# Patient Record
Sex: FEMALE | Race: WHITE | NOT HISPANIC OR LATINO
[De-identification: names, ages, dates, MRNs, and addresses within clinical notes are randomized per-mention and may not be internally consistent; named-entity substitution may affect disease eponyms.]

---

## 2017-01-13 ENCOUNTER — APPOINTMENT (OUTPATIENT)
Dept: PHYSICAL MEDICINE AND REHAB | Facility: CLINIC | Age: 82
End: 2017-01-13

## 2017-01-20 ENCOUNTER — APPOINTMENT (OUTPATIENT)
Dept: CARDIOLOGY | Facility: CLINIC | Age: 82
End: 2017-01-20

## 2017-01-20 ENCOUNTER — EMERGENCY (EMERGENCY)
Facility: HOSPITAL | Age: 82
LOS: 1 days | Discharge: HOME | End: 2017-01-20
Admitting: FAMILY MEDICINE
Payer: MEDICARE

## 2017-01-20 DIAGNOSIS — I95.9 HYPOTENSION, UNSPECIFIED: ICD-10-CM

## 2017-01-20 DIAGNOSIS — Z88.2 ALLERGY STATUS TO SULFONAMIDES: ICD-10-CM

## 2017-01-20 DIAGNOSIS — Z90.710 ACQUIRED ABSENCE OF BOTH CERVIX AND UTERUS: Chronic | ICD-10-CM

## 2017-01-20 DIAGNOSIS — Z79.899 OTHER LONG TERM (CURRENT) DRUG THERAPY: ICD-10-CM

## 2017-01-20 DIAGNOSIS — R07.89 OTHER CHEST PAIN: ICD-10-CM

## 2017-01-20 DIAGNOSIS — F41.9 ANXIETY DISORDER, UNSPECIFIED: ICD-10-CM

## 2017-01-20 DIAGNOSIS — I24.9 ACUTE ISCHEMIC HEART DISEASE, UNSPECIFIED: ICD-10-CM

## 2017-01-20 DIAGNOSIS — R55 SYNCOPE AND COLLAPSE: ICD-10-CM

## 2017-01-20 DIAGNOSIS — Z98.49 CATARACT EXTRACTION STATUS, UNSPECIFIED EYE: Chronic | ICD-10-CM

## 2017-01-20 PROCEDURE — 99220: CPT

## 2017-01-20 PROCEDURE — 99285 EMERGENCY DEPT VISIT HI MDM: CPT

## 2017-01-20 PROCEDURE — 74174 CTA ABD&PLVS W/CONTRAST: CPT | Mod: 26

## 2017-01-20 PROCEDURE — 71010: CPT | Mod: 26

## 2017-01-20 PROCEDURE — 71275 CT ANGIOGRAPHY CHEST: CPT | Mod: 26

## 2017-01-20 PROCEDURE — 70450 CT HEAD/BRAIN W/O DYE: CPT | Mod: 26

## 2017-01-20 PROCEDURE — 99214 OFFICE O/P EST MOD 30 MIN: CPT

## 2017-01-20 PROCEDURE — 99204 OFFICE O/P NEW MOD 45 MIN: CPT

## 2017-01-20 PROCEDURE — 93010 ELECTROCARDIOGRAM REPORT: CPT

## 2017-01-21 PROCEDURE — 85730 THROMBOPLASTIN TIME PARTIAL: CPT

## 2017-01-21 PROCEDURE — 74174 CTA ABD&PLVS W/CONTRAST: CPT

## 2017-01-21 PROCEDURE — 93306 TTE W/DOPPLER COMPLETE: CPT | Mod: 26

## 2017-01-21 PROCEDURE — 93010 ELECTROCARDIOGRAM REPORT: CPT

## 2017-01-21 PROCEDURE — 99217: CPT

## 2017-01-21 PROCEDURE — 71045 X-RAY EXAM CHEST 1 VIEW: CPT

## 2017-01-21 PROCEDURE — 93306 TTE W/DOPPLER COMPLETE: CPT

## 2017-01-21 PROCEDURE — 36415 COLL VENOUS BLD VENIPUNCTURE: CPT

## 2017-01-21 PROCEDURE — 84484 ASSAY OF TROPONIN QUANT: CPT

## 2017-01-21 PROCEDURE — 93005 ELECTROCARDIOGRAM TRACING: CPT

## 2017-01-21 PROCEDURE — 85027 COMPLETE CBC AUTOMATED: CPT

## 2017-01-21 PROCEDURE — 70450 CT HEAD/BRAIN W/O DYE: CPT

## 2017-01-21 PROCEDURE — 80048 BASIC METABOLIC PNL TOTAL CA: CPT

## 2017-01-21 PROCEDURE — 99214 OFFICE O/P EST MOD 30 MIN: CPT

## 2017-01-21 PROCEDURE — 96372 THER/PROPH/DIAG INJ SC/IM: CPT | Mod: XU

## 2017-01-21 PROCEDURE — 99284 EMERGENCY DEPT VISIT MOD MDM: CPT | Mod: 25

## 2017-01-21 PROCEDURE — G0378: CPT

## 2017-01-21 PROCEDURE — 85610 PROTHROMBIN TIME: CPT

## 2017-01-21 PROCEDURE — 71275 CT ANGIOGRAPHY CHEST: CPT

## 2017-03-27 ENCOUNTER — RX RENEWAL (OUTPATIENT)
Age: 82
End: 2017-03-27

## 2017-03-29 ENCOUNTER — APPOINTMENT (OUTPATIENT)
Dept: OBGYN | Facility: CLINIC | Age: 82
End: 2017-03-29

## 2017-03-29 VITALS
DIASTOLIC BLOOD PRESSURE: 90 MMHG | BODY MASS INDEX: 18.95 KG/M2 | RESPIRATION RATE: 18 BRPM | OXYGEN SATURATION: 98 % | HEART RATE: 86 BPM | HEIGHT: 64 IN | WEIGHT: 111 LBS | SYSTOLIC BLOOD PRESSURE: 150 MMHG

## 2017-03-29 DIAGNOSIS — N81.11 CYSTOCELE, MIDLINE: ICD-10-CM

## 2017-03-29 DIAGNOSIS — N39.0 URINARY TRACT INFECTION, SITE NOT SPECIFIED: ICD-10-CM

## 2017-03-29 LAB
APPEARANCE: CLEAR
BILIRUBIN URINE: NEGATIVE
BLOOD URINE: NEGATIVE
COLOR: YELLOW
GLUCOSE QUALITATIVE U: NEGATIVE
KETONES URINE: NEGATIVE
LEUKOCYTE ESTERASE URINE: NEGATIVE
NITRITE URINE: NEGATIVE
PH URINE: 7.5
PROTEIN URINE: NEGATIVE
SPECIFIC GRAVITY URINE: 1.01
UROBILINOGEN URINE: NORMAL

## 2017-03-29 RX ORDER — FLUTICASONE PROPIONATE 50 UG/1
50 SPRAY, METERED NASAL
Qty: 16 | Refills: 0 | Status: ACTIVE | COMMUNITY
Start: 2016-07-28

## 2017-03-29 RX ORDER — FLUOROURACIL 50 MG/G
5 CREAM TOPICAL
Qty: 40 | Refills: 0 | Status: ACTIVE | COMMUNITY
Start: 2016-10-31

## 2017-03-29 RX ORDER — TRIAMCINOLONE ACETONIDE 1 MG/G
0.1 CREAM TOPICAL
Qty: 80 | Refills: 0 | Status: ACTIVE | COMMUNITY
Start: 2016-10-31

## 2017-03-29 RX ORDER — CLOTRIMAZOLE 10 MG/1
10 LOZENGE ORAL
Qty: 70 | Refills: 0 | Status: ACTIVE | COMMUNITY
Start: 2016-12-27

## 2017-03-29 RX ORDER — PREDNISONE 10 MG/1
10 TABLET ORAL
Qty: 50 | Refills: 0 | Status: ACTIVE | COMMUNITY
Start: 2016-12-08

## 2017-03-31 ENCOUNTER — APPOINTMENT (OUTPATIENT)
Dept: OBGYN | Facility: CLINIC | Age: 82
End: 2017-03-31

## 2017-03-31 VITALS
WEIGHT: 111 LBS | HEIGHT: 64 IN | SYSTOLIC BLOOD PRESSURE: 138 MMHG | DIASTOLIC BLOOD PRESSURE: 82 MMHG | BODY MASS INDEX: 18.95 KG/M2 | OXYGEN SATURATION: 98 % | HEART RATE: 72 BPM

## 2017-03-31 DIAGNOSIS — R25.1 TREMOR, UNSPECIFIED: ICD-10-CM

## 2017-03-31 DIAGNOSIS — T83.9XXA UNSPECIFIED COMPLICATION OF GENITOURINARY PROSTHETIC DEVICE, IMPLANT AND GRAFT, INITIAL ENCOUNTER: ICD-10-CM

## 2017-03-31 LAB — BACTERIA UR CULT: NORMAL

## 2017-04-06 ENCOUNTER — APPOINTMENT (OUTPATIENT)
Dept: OBGYN | Facility: CLINIC | Age: 82
End: 2017-04-06

## 2017-04-11 ENCOUNTER — APPOINTMENT (OUTPATIENT)
Dept: CARDIOLOGY | Facility: CLINIC | Age: 82
End: 2017-04-11

## 2017-04-11 ENCOUNTER — NON-APPOINTMENT (OUTPATIENT)
Age: 82
End: 2017-04-11

## 2017-04-11 VITALS
SYSTOLIC BLOOD PRESSURE: 150 MMHG | RESPIRATION RATE: 17 BRPM | BODY MASS INDEX: 19.29 KG/M2 | DIASTOLIC BLOOD PRESSURE: 80 MMHG | HEART RATE: 94 BPM | HEIGHT: 64 IN | OXYGEN SATURATION: 97 % | WEIGHT: 113 LBS

## 2017-06-05 ENCOUNTER — OUTPATIENT (OUTPATIENT)
Dept: OUTPATIENT SERVICES | Facility: HOSPITAL | Age: 82
LOS: 1 days | End: 2017-06-05
Payer: MEDICARE

## 2017-06-05 ENCOUNTER — APPOINTMENT (OUTPATIENT)
Dept: RADIOLOGY | Facility: HOSPITAL | Age: 82
End: 2017-06-05

## 2017-06-05 DIAGNOSIS — Z90.710 ACQUIRED ABSENCE OF BOTH CERVIX AND UTERUS: Chronic | ICD-10-CM

## 2017-06-05 DIAGNOSIS — M54.6 PAIN IN THORACIC SPINE: ICD-10-CM

## 2017-06-05 DIAGNOSIS — Z98.49 CATARACT EXTRACTION STATUS, UNSPECIFIED EYE: Chronic | ICD-10-CM

## 2017-06-05 DIAGNOSIS — R06.02 SHORTNESS OF BREATH: ICD-10-CM

## 2017-06-05 DIAGNOSIS — R07.9 CHEST PAIN, UNSPECIFIED: ICD-10-CM

## 2017-06-05 PROCEDURE — 71046 X-RAY EXAM CHEST 2 VIEWS: CPT

## 2017-06-05 PROCEDURE — 72070 X-RAY EXAM THORAC SPINE 2VWS: CPT

## 2017-06-06 ENCOUNTER — OUTPATIENT (OUTPATIENT)
Dept: OUTPATIENT SERVICES | Facility: HOSPITAL | Age: 82
LOS: 1 days | End: 2017-06-06
Payer: MEDICARE

## 2017-06-06 ENCOUNTER — APPOINTMENT (OUTPATIENT)
Dept: MRI IMAGING | Facility: HOSPITAL | Age: 82
End: 2017-06-06

## 2017-06-06 DIAGNOSIS — Z98.49 CATARACT EXTRACTION STATUS, UNSPECIFIED EYE: Chronic | ICD-10-CM

## 2017-06-06 DIAGNOSIS — Z90.710 ACQUIRED ABSENCE OF BOTH CERVIX AND UTERUS: Chronic | ICD-10-CM

## 2017-06-06 DIAGNOSIS — M51.34 OTHER INTERVERTEBRAL DISC DEGENERATION, THORACIC REGION: ICD-10-CM

## 2017-06-06 PROCEDURE — 72146 MRI CHEST SPINE W/O DYE: CPT

## 2017-09-06 ENCOUNTER — RX RENEWAL (OUTPATIENT)
Age: 82
End: 2017-09-06

## 2017-09-27 ENCOUNTER — APPOINTMENT (OUTPATIENT)
Dept: PULMONOLOGY | Facility: CLINIC | Age: 82
End: 2017-09-27
Payer: MEDICARE

## 2017-09-27 VITALS
OXYGEN SATURATION: 97 % | HEIGHT: 64 IN | SYSTOLIC BLOOD PRESSURE: 132 MMHG | DIASTOLIC BLOOD PRESSURE: 80 MMHG | WEIGHT: 109 LBS | BODY MASS INDEX: 18.61 KG/M2 | HEART RATE: 99 BPM

## 2017-09-27 DIAGNOSIS — R05 COUGH: ICD-10-CM

## 2017-09-27 PROCEDURE — 99214 OFFICE O/P EST MOD 30 MIN: CPT

## 2017-10-17 ENCOUNTER — APPOINTMENT (OUTPATIENT)
Dept: CARDIOLOGY | Facility: CLINIC | Age: 82
End: 2017-10-17
Payer: MEDICARE

## 2017-10-17 ENCOUNTER — NON-APPOINTMENT (OUTPATIENT)
Age: 82
End: 2017-10-17

## 2017-10-17 VITALS
DIASTOLIC BLOOD PRESSURE: 90 MMHG | RESPIRATION RATE: 17 BRPM | WEIGHT: 109 LBS | OXYGEN SATURATION: 98 % | HEIGHT: 64 IN | SYSTOLIC BLOOD PRESSURE: 153 MMHG | BODY MASS INDEX: 18.61 KG/M2 | HEART RATE: 75 BPM

## 2017-10-17 PROCEDURE — 99215 OFFICE O/P EST HI 40 MIN: CPT

## 2017-10-17 PROCEDURE — 93000 ELECTROCARDIOGRAM COMPLETE: CPT

## 2017-10-23 ENCOUNTER — APPOINTMENT (OUTPATIENT)
Dept: ORTHOPEDIC SURGERY | Facility: CLINIC | Age: 82
End: 2017-10-23
Payer: MEDICARE

## 2017-10-23 VITALS
BODY MASS INDEX: 18.44 KG/M2 | HEIGHT: 64 IN | DIASTOLIC BLOOD PRESSURE: 95 MMHG | WEIGHT: 108 LBS | SYSTOLIC BLOOD PRESSURE: 154 MMHG | HEART RATE: 77 BPM

## 2017-10-23 DIAGNOSIS — M79.604 PAIN IN RIGHT LEG: ICD-10-CM

## 2017-10-23 DIAGNOSIS — M79.605 PAIN IN RIGHT LEG: ICD-10-CM

## 2017-10-23 PROCEDURE — 73565 X-RAY EXAM OF KNEES: CPT | Mod: RT

## 2017-10-23 PROCEDURE — 99203 OFFICE O/P NEW LOW 30 MIN: CPT

## 2017-11-13 ENCOUNTER — RX RENEWAL (OUTPATIENT)
Age: 82
End: 2017-11-13

## 2017-12-08 ENCOUNTER — RX RENEWAL (OUTPATIENT)
Age: 82
End: 2017-12-08

## 2017-12-14 ENCOUNTER — FORM ENCOUNTER (OUTPATIENT)
Age: 82
End: 2017-12-14

## 2017-12-15 ENCOUNTER — APPOINTMENT (OUTPATIENT)
Dept: MAMMOGRAPHY | Facility: HOSPITAL | Age: 82
End: 2017-12-15
Payer: MEDICARE

## 2017-12-15 ENCOUNTER — OUTPATIENT (OUTPATIENT)
Dept: OUTPATIENT SERVICES | Facility: HOSPITAL | Age: 82
LOS: 1 days | End: 2017-12-15
Payer: MEDICARE

## 2017-12-15 DIAGNOSIS — Z12.31 ENCOUNTER FOR SCREENING MAMMOGRAM FOR MALIGNANT NEOPLASM OF BREAST: ICD-10-CM

## 2017-12-15 DIAGNOSIS — Z90.710 ACQUIRED ABSENCE OF BOTH CERVIX AND UTERUS: Chronic | ICD-10-CM

## 2017-12-15 DIAGNOSIS — Z98.49 CATARACT EXTRACTION STATUS, UNSPECIFIED EYE: Chronic | ICD-10-CM

## 2017-12-15 DIAGNOSIS — Z00.8 ENCOUNTER FOR OTHER GENERAL EXAMINATION: ICD-10-CM

## 2017-12-15 PROCEDURE — 77067 SCR MAMMO BI INCL CAD: CPT

## 2017-12-15 PROCEDURE — 77063 BREAST TOMOSYNTHESIS BI: CPT | Mod: 26

## 2017-12-15 PROCEDURE — 77063 BREAST TOMOSYNTHESIS BI: CPT

## 2017-12-15 PROCEDURE — G0202: CPT | Mod: 26

## 2017-12-27 ENCOUNTER — FORM ENCOUNTER (OUTPATIENT)
Age: 82
End: 2017-12-27

## 2017-12-27 DIAGNOSIS — R92.8 OTHER ABNORMAL AND INCONCLUSIVE FINDINGS ON DIAGNOSTIC IMAGING OF BREAST: ICD-10-CM

## 2017-12-27 DIAGNOSIS — R93.8 ABNORMAL FINDINGS ON DIAGNOSTIC IMAGING OF OTHER SPECIFIED BODY STRUCTURES: ICD-10-CM

## 2017-12-28 ENCOUNTER — APPOINTMENT (OUTPATIENT)
Dept: ULTRASOUND IMAGING | Facility: HOSPITAL | Age: 82
End: 2017-12-28
Payer: MEDICARE

## 2017-12-28 ENCOUNTER — APPOINTMENT (OUTPATIENT)
Dept: MAMMOGRAPHY | Facility: HOSPITAL | Age: 82
End: 2017-12-28
Payer: MEDICARE

## 2017-12-28 ENCOUNTER — OUTPATIENT (OUTPATIENT)
Dept: OUTPATIENT SERVICES | Facility: HOSPITAL | Age: 82
LOS: 1 days | End: 2017-12-28
Payer: MEDICARE

## 2017-12-28 DIAGNOSIS — R92.0 MAMMOGRAPHIC MICROCALCIFICATION FOUND ON DIAGNOSTIC IMAGING OF BREAST: ICD-10-CM

## 2017-12-28 DIAGNOSIS — Z90.710 ACQUIRED ABSENCE OF BOTH CERVIX AND UTERUS: Chronic | ICD-10-CM

## 2017-12-28 DIAGNOSIS — Z00.8 ENCOUNTER FOR OTHER GENERAL EXAMINATION: ICD-10-CM

## 2017-12-28 DIAGNOSIS — Z98.49 CATARACT EXTRACTION STATUS, UNSPECIFIED EYE: Chronic | ICD-10-CM

## 2017-12-28 PROCEDURE — G0206: CPT | Mod: 26

## 2017-12-28 PROCEDURE — G0279: CPT | Mod: 26

## 2017-12-28 PROCEDURE — 77065 DX MAMMO INCL CAD UNI: CPT

## 2017-12-28 PROCEDURE — G0279: CPT

## 2017-12-29 PROBLEM — R93.8 ABNORMAL CHEST XRAY: Status: ACTIVE | Noted: 2017-12-29

## 2017-12-29 PROBLEM — R92.8 ABNORMAL MAMMOGRAM: Status: ACTIVE | Noted: 2017-12-29

## 2018-01-19 ENCOUNTER — APPOINTMENT (OUTPATIENT)
Dept: CARDIOLOGY | Facility: CLINIC | Age: 83
End: 2018-01-19
Payer: MEDICARE

## 2018-01-19 ENCOUNTER — NON-APPOINTMENT (OUTPATIENT)
Age: 83
End: 2018-01-19

## 2018-01-19 VITALS
RESPIRATION RATE: 17 BRPM | SYSTOLIC BLOOD PRESSURE: 180 MMHG | BODY MASS INDEX: 17.75 KG/M2 | HEART RATE: 68 BPM | OXYGEN SATURATION: 98 % | HEIGHT: 64 IN | DIASTOLIC BLOOD PRESSURE: 96 MMHG | WEIGHT: 104 LBS

## 2018-01-19 DIAGNOSIS — R53.81 OTHER MALAISE: ICD-10-CM

## 2018-01-19 DIAGNOSIS — R53.83 OTHER MALAISE: ICD-10-CM

## 2018-01-19 PROCEDURE — 93306 TTE W/DOPPLER COMPLETE: CPT

## 2018-01-19 PROCEDURE — 99215 OFFICE O/P EST HI 40 MIN: CPT

## 2018-01-19 PROCEDURE — 93000 ELECTROCARDIOGRAM COMPLETE: CPT

## 2018-01-29 RX ORDER — DILTIAZEM HYDROCHLORIDE 120 MG/1
120 CAPSULE, EXTENDED RELEASE ORAL
Qty: 30 | Refills: 1 | Status: DISCONTINUED | COMMUNITY
Start: 2018-01-19 | End: 2018-01-29

## 2018-02-08 ENCOUNTER — FORM ENCOUNTER (OUTPATIENT)
Age: 83
End: 2018-02-08

## 2018-02-09 ENCOUNTER — OUTPATIENT (OUTPATIENT)
Dept: OUTPATIENT SERVICES | Facility: HOSPITAL | Age: 83
LOS: 1 days | End: 2018-02-09
Payer: MEDICARE

## 2018-02-09 ENCOUNTER — APPOINTMENT (OUTPATIENT)
Dept: MAMMOGRAPHY | Facility: CLINIC | Age: 83
End: 2018-02-09

## 2018-02-09 ENCOUNTER — RESULT REVIEW (OUTPATIENT)
Age: 83
End: 2018-02-09

## 2018-02-09 DIAGNOSIS — Z00.8 ENCOUNTER FOR OTHER GENERAL EXAMINATION: ICD-10-CM

## 2018-02-09 DIAGNOSIS — Z98.49 CATARACT EXTRACTION STATUS, UNSPECIFIED EYE: Chronic | ICD-10-CM

## 2018-02-09 DIAGNOSIS — Z90.710 ACQUIRED ABSENCE OF BOTH CERVIX AND UTERUS: Chronic | ICD-10-CM

## 2018-02-09 PROCEDURE — 77065 DX MAMMO INCL CAD UNI: CPT | Mod: 26,LT

## 2018-02-09 PROCEDURE — 19081 BX BREAST 1ST LESION STRTCTC: CPT | Mod: LT

## 2018-02-09 PROCEDURE — 19081 BX BREAST 1ST LESION STRTCTC: CPT

## 2018-02-09 PROCEDURE — 77065 DX MAMMO INCL CAD UNI: CPT

## 2018-02-09 PROCEDURE — A4648: CPT

## 2018-02-23 ENCOUNTER — APPOINTMENT (OUTPATIENT)
Dept: MRI IMAGING | Facility: CLINIC | Age: 83
End: 2018-02-23

## 2018-02-23 ENCOUNTER — OUTPATIENT (OUTPATIENT)
Dept: OUTPATIENT SERVICES | Facility: HOSPITAL | Age: 83
LOS: 1 days | End: 2018-02-23
Payer: MEDICARE

## 2018-02-23 DIAGNOSIS — Z98.49 CATARACT EXTRACTION STATUS, UNSPECIFIED EYE: Chronic | ICD-10-CM

## 2018-02-23 DIAGNOSIS — Z00.8 ENCOUNTER FOR OTHER GENERAL EXAMINATION: ICD-10-CM

## 2018-02-23 DIAGNOSIS — Z90.710 ACQUIRED ABSENCE OF BOTH CERVIX AND UTERUS: Chronic | ICD-10-CM

## 2018-02-23 PROCEDURE — 72195 MRI PELVIS W/O DYE: CPT

## 2018-02-23 PROCEDURE — 72195 MRI PELVIS W/O DYE: CPT | Mod: 26

## 2018-03-23 ENCOUNTER — RX RENEWAL (OUTPATIENT)
Age: 83
End: 2018-03-23

## 2018-04-20 ENCOUNTER — APPOINTMENT (OUTPATIENT)
Dept: CARDIOLOGY | Facility: CLINIC | Age: 83
End: 2018-04-20
Payer: MEDICARE

## 2018-04-20 ENCOUNTER — NON-APPOINTMENT (OUTPATIENT)
Age: 83
End: 2018-04-20

## 2018-04-20 VITALS
BODY MASS INDEX: 18.1 KG/M2 | OXYGEN SATURATION: 98 % | SYSTOLIC BLOOD PRESSURE: 158 MMHG | WEIGHT: 106 LBS | RESPIRATION RATE: 17 BRPM | HEIGHT: 64 IN | DIASTOLIC BLOOD PRESSURE: 82 MMHG | HEART RATE: 107 BPM

## 2018-04-20 PROCEDURE — 93000 ELECTROCARDIOGRAM COMPLETE: CPT

## 2018-04-20 PROCEDURE — 99215 OFFICE O/P EST HI 40 MIN: CPT

## 2018-04-23 ENCOUNTER — OUTPATIENT (OUTPATIENT)
Dept: OUTPATIENT SERVICES | Facility: HOSPITAL | Age: 83
LOS: 1 days | End: 2018-04-23
Payer: MEDICARE

## 2018-04-23 DIAGNOSIS — Z90.710 ACQUIRED ABSENCE OF BOTH CERVIX AND UTERUS: Chronic | ICD-10-CM

## 2018-04-23 DIAGNOSIS — Z98.49 CATARACT EXTRACTION STATUS, UNSPECIFIED EYE: Chronic | ICD-10-CM

## 2018-04-23 DIAGNOSIS — Z01.818 ENCOUNTER FOR OTHER PREPROCEDURAL EXAMINATION: ICD-10-CM

## 2018-04-23 DIAGNOSIS — N81.6 RECTOCELE: ICD-10-CM

## 2018-04-23 DIAGNOSIS — I10 ESSENTIAL (PRIMARY) HYPERTENSION: ICD-10-CM

## 2018-04-23 PROCEDURE — 85027 COMPLETE CBC AUTOMATED: CPT

## 2018-04-23 PROCEDURE — 87086 URINE CULTURE/COLONY COUNT: CPT

## 2018-04-23 PROCEDURE — 36415 COLL VENOUS BLD VENIPUNCTURE: CPT

## 2018-04-23 PROCEDURE — G0463: CPT

## 2018-04-23 PROCEDURE — 81003 URINALYSIS AUTO W/O SCOPE: CPT

## 2018-04-23 PROCEDURE — 80048 BASIC METABOLIC PNL TOTAL CA: CPT

## 2018-04-30 ENCOUNTER — APPOINTMENT (OUTPATIENT)
Dept: PULMONOLOGY | Facility: CLINIC | Age: 83
End: 2018-04-30
Payer: MEDICARE

## 2018-04-30 VITALS
TEMPERATURE: 97.8 F | DIASTOLIC BLOOD PRESSURE: 70 MMHG | SYSTOLIC BLOOD PRESSURE: 140 MMHG | RESPIRATION RATE: 18 BRPM | HEIGHT: 64 IN | BODY MASS INDEX: 17.93 KG/M2 | OXYGEN SATURATION: 98 % | WEIGHT: 105 LBS | HEART RATE: 72 BPM

## 2018-04-30 DIAGNOSIS — N81.9 FEMALE GENITAL PROLAPSE, UNSPECIFIED: ICD-10-CM

## 2018-04-30 PROCEDURE — 99213 OFFICE O/P EST LOW 20 MIN: CPT

## 2018-05-06 ENCOUNTER — TRANSCRIPTION ENCOUNTER (OUTPATIENT)
Age: 83
End: 2018-05-06

## 2018-05-07 ENCOUNTER — OUTPATIENT (OUTPATIENT)
Dept: OUTPATIENT SERVICES | Facility: HOSPITAL | Age: 83
LOS: 1 days | End: 2018-05-07
Payer: MEDICARE

## 2018-05-07 DIAGNOSIS — I10 ESSENTIAL (PRIMARY) HYPERTENSION: ICD-10-CM

## 2018-05-07 DIAGNOSIS — I49.1 ATRIAL PREMATURE DEPOLARIZATION: ICD-10-CM

## 2018-05-07 DIAGNOSIS — Z88.2 ALLERGY STATUS TO SULFONAMIDES: ICD-10-CM

## 2018-05-07 DIAGNOSIS — I71.9 AORTIC ANEURYSM OF UNSPECIFIED SITE, WITHOUT RUPTURE: ICD-10-CM

## 2018-05-07 DIAGNOSIS — N99.3 PROLAPSE OF VAGINAL VAULT AFTER HYSTERECTOMY: ICD-10-CM

## 2018-05-07 DIAGNOSIS — M81.6 LOCALIZED OSTEOPOROSIS [LEQUESNE]: ICD-10-CM

## 2018-05-07 DIAGNOSIS — Z90.710 ACQUIRED ABSENCE OF BOTH CERVIX AND UTERUS: Chronic | ICD-10-CM

## 2018-05-07 DIAGNOSIS — I25.10 ATHEROSCLEROTIC HEART DISEASE OF NATIVE CORONARY ARTERY WITHOUT ANGINA PECTORIS: ICD-10-CM

## 2018-05-07 DIAGNOSIS — E78.5 HYPERLIPIDEMIA, UNSPECIFIED: ICD-10-CM

## 2018-05-07 DIAGNOSIS — G47.00 INSOMNIA, UNSPECIFIED: ICD-10-CM

## 2018-05-07 DIAGNOSIS — M81.0 AGE-RELATED OSTEOPOROSIS WITHOUT CURRENT PATHOLOGICAL FRACTURE: ICD-10-CM

## 2018-05-07 DIAGNOSIS — Z79.52 LONG TERM (CURRENT) USE OF SYSTEMIC STEROIDS: ICD-10-CM

## 2018-05-07 DIAGNOSIS — Z88.1 ALLERGY STATUS TO OTHER ANTIBIOTIC AGENTS STATUS: ICD-10-CM

## 2018-05-07 DIAGNOSIS — Z98.49 CATARACT EXTRACTION STATUS, UNSPECIFIED EYE: Chronic | ICD-10-CM

## 2018-05-08 PROCEDURE — 57250 REPAIR RECTUM & VAGINA: CPT

## 2018-05-08 PROCEDURE — 57267 INSERT MESH/PELVIC FLR ADDON: CPT

## 2018-05-08 PROCEDURE — C1763: CPT

## 2018-05-11 ENCOUNTER — APPOINTMENT (OUTPATIENT)
Dept: CARDIOLOGY | Facility: CLINIC | Age: 83
End: 2018-05-11

## 2018-05-22 ENCOUNTER — RX RENEWAL (OUTPATIENT)
Age: 83
End: 2018-05-22

## 2018-06-15 ENCOUNTER — MEDICATION RENEWAL (OUTPATIENT)
Age: 83
End: 2018-06-15

## 2018-07-20 ENCOUNTER — NON-APPOINTMENT (OUTPATIENT)
Age: 83
End: 2018-07-20

## 2018-07-20 ENCOUNTER — APPOINTMENT (OUTPATIENT)
Dept: CARDIOLOGY | Facility: CLINIC | Age: 83
End: 2018-07-20
Payer: MEDICARE

## 2018-07-20 VITALS
HEIGHT: 64 IN | BODY MASS INDEX: 19.46 KG/M2 | SYSTOLIC BLOOD PRESSURE: 148 MMHG | OXYGEN SATURATION: 96 % | RESPIRATION RATE: 17 BRPM | DIASTOLIC BLOOD PRESSURE: 84 MMHG | WEIGHT: 114 LBS | HEART RATE: 69 BPM

## 2018-07-20 DIAGNOSIS — R60.9 EDEMA, UNSPECIFIED: ICD-10-CM

## 2018-07-20 PROCEDURE — 93000 ELECTROCARDIOGRAM COMPLETE: CPT

## 2018-07-20 PROCEDURE — 99215 OFFICE O/P EST HI 40 MIN: CPT

## 2018-07-20 RX ORDER — AMLODIPINE BESYLATE 5 MG/1
5 TABLET ORAL DAILY
Qty: 90 | Refills: 1 | Status: DISCONTINUED | COMMUNITY
Start: 2018-04-20 | End: 2018-07-20

## 2018-07-27 ENCOUNTER — APPOINTMENT (OUTPATIENT)
Dept: CARDIOLOGY | Facility: CLINIC | Age: 83
End: 2018-07-27

## 2018-08-10 ENCOUNTER — OUTPATIENT (OUTPATIENT)
Dept: OUTPATIENT SERVICES | Facility: HOSPITAL | Age: 83
LOS: 1 days | End: 2018-08-10
Payer: MEDICARE

## 2018-08-10 ENCOUNTER — MEDICATION RENEWAL (OUTPATIENT)
Age: 83
End: 2018-08-10

## 2018-08-10 ENCOUNTER — APPOINTMENT (OUTPATIENT)
Dept: MAMMOGRAPHY | Facility: HOSPITAL | Age: 83
End: 2018-08-10
Payer: MEDICARE

## 2018-08-10 DIAGNOSIS — Z90.710 ACQUIRED ABSENCE OF BOTH CERVIX AND UTERUS: Chronic | ICD-10-CM

## 2018-08-10 DIAGNOSIS — Z00.8 ENCOUNTER FOR OTHER GENERAL EXAMINATION: ICD-10-CM

## 2018-08-10 DIAGNOSIS — Z98.49 CATARACT EXTRACTION STATUS, UNSPECIFIED EYE: Chronic | ICD-10-CM

## 2018-08-10 PROCEDURE — G0279: CPT

## 2018-08-10 PROCEDURE — G0279: CPT | Mod: 26

## 2018-08-10 PROCEDURE — 77065 DX MAMMO INCL CAD UNI: CPT

## 2018-08-10 PROCEDURE — 77065 DX MAMMO INCL CAD UNI: CPT | Mod: 26,LT

## 2018-08-16 ENCOUNTER — APPOINTMENT (OUTPATIENT)
Dept: OBGYN | Facility: CLINIC | Age: 83
End: 2018-08-16
Payer: MEDICARE

## 2018-08-16 VITALS
RESPIRATION RATE: 16 BRPM | OXYGEN SATURATION: 98 % | WEIGHT: 114 LBS | BODY MASS INDEX: 19.46 KG/M2 | HEART RATE: 66 BPM | DIASTOLIC BLOOD PRESSURE: 86 MMHG | HEIGHT: 64 IN | SYSTOLIC BLOOD PRESSURE: 166 MMHG

## 2018-08-16 DIAGNOSIS — L29.9 PRURITUS, UNSPECIFIED: ICD-10-CM

## 2018-08-16 PROCEDURE — 99214 OFFICE O/P EST MOD 30 MIN: CPT

## 2018-08-16 RX ORDER — LEVOFLOXACIN 500 MG/1
500 TABLET, FILM COATED ORAL DAILY
Qty: 7 | Refills: 5 | Status: COMPLETED | COMMUNITY
Start: 2017-09-27 | End: 2018-08-16

## 2018-08-16 RX ORDER — FLUOCINONIDE 0.05 MG/G
0.05 OINTMENT TOPICAL
Qty: 1 | Refills: 3 | Status: ACTIVE | COMMUNITY
Start: 2018-08-16 | End: 1900-01-01

## 2018-08-16 RX ORDER — LEVOFLOXACIN 500 MG/1
500 TABLET, FILM COATED ORAL DAILY
Qty: 7 | Refills: 5 | Status: COMPLETED | COMMUNITY
Start: 2018-04-09 | End: 2018-08-16

## 2018-08-17 ENCOUNTER — OTHER (OUTPATIENT)
Age: 83
End: 2018-08-17

## 2018-08-17 LAB
CANDIDA VAG CYTO: NOT DETECTED
G VAGINALIS+PREV SP MTYP VAG QL MICRO: DETECTED
T VAGINALIS VAG QL WET PREP: NOT DETECTED

## 2018-10-19 ENCOUNTER — APPOINTMENT (OUTPATIENT)
Dept: CARDIOLOGY | Facility: CLINIC | Age: 83
End: 2018-10-19
Payer: MEDICARE

## 2018-10-19 ENCOUNTER — NON-APPOINTMENT (OUTPATIENT)
Age: 83
End: 2018-10-19

## 2018-10-19 VITALS
OXYGEN SATURATION: 98 % | RESPIRATION RATE: 17 BRPM | HEIGHT: 64 IN | WEIGHT: 114 LBS | BODY MASS INDEX: 19.46 KG/M2 | SYSTOLIC BLOOD PRESSURE: 160 MMHG | HEART RATE: 63 BPM | DIASTOLIC BLOOD PRESSURE: 90 MMHG

## 2018-10-19 PROCEDURE — 99214 OFFICE O/P EST MOD 30 MIN: CPT

## 2018-10-19 PROCEDURE — 93000 ELECTROCARDIOGRAM COMPLETE: CPT

## 2018-12-24 ENCOUNTER — RX RENEWAL (OUTPATIENT)
Age: 83
End: 2018-12-24

## 2019-01-16 ENCOUNTER — OUTPATIENT (OUTPATIENT)
Dept: OUTPATIENT SERVICES | Facility: HOSPITAL | Age: 84
LOS: 1 days | End: 2019-01-16
Payer: MEDICARE

## 2019-01-16 ENCOUNTER — APPOINTMENT (OUTPATIENT)
Dept: CT IMAGING | Facility: HOSPITAL | Age: 84
End: 2019-01-16
Payer: MEDICARE

## 2019-01-16 DIAGNOSIS — Z98.49 CATARACT EXTRACTION STATUS, UNSPECIFIED EYE: Chronic | ICD-10-CM

## 2019-01-16 DIAGNOSIS — Z00.8 ENCOUNTER FOR OTHER GENERAL EXAMINATION: ICD-10-CM

## 2019-01-16 DIAGNOSIS — Z90.710 ACQUIRED ABSENCE OF BOTH CERVIX AND UTERUS: Chronic | ICD-10-CM

## 2019-01-16 PROCEDURE — 74176 CT ABD & PELVIS W/O CONTRAST: CPT | Mod: 26

## 2019-01-16 PROCEDURE — 74176 CT ABD & PELVIS W/O CONTRAST: CPT

## 2019-01-23 ENCOUNTER — RX RENEWAL (OUTPATIENT)
Age: 84
End: 2019-01-23

## 2019-02-25 ENCOUNTER — APPOINTMENT (OUTPATIENT)
Dept: ULTRASOUND IMAGING | Facility: HOSPITAL | Age: 84
End: 2019-02-25
Payer: MEDICARE

## 2019-02-25 ENCOUNTER — OUTPATIENT (OUTPATIENT)
Dept: OUTPATIENT SERVICES | Facility: HOSPITAL | Age: 84
LOS: 1 days | End: 2019-02-25
Payer: MEDICARE

## 2019-02-25 ENCOUNTER — APPOINTMENT (OUTPATIENT)
Dept: MAMMOGRAPHY | Facility: HOSPITAL | Age: 84
End: 2019-02-25
Payer: MEDICARE

## 2019-02-25 DIAGNOSIS — Z00.8 ENCOUNTER FOR OTHER GENERAL EXAMINATION: ICD-10-CM

## 2019-02-25 DIAGNOSIS — Z98.49 CATARACT EXTRACTION STATUS, UNSPECIFIED EYE: Chronic | ICD-10-CM

## 2019-02-25 DIAGNOSIS — Z90.710 ACQUIRED ABSENCE OF BOTH CERVIX AND UTERUS: Chronic | ICD-10-CM

## 2019-02-25 PROCEDURE — 76641 ULTRASOUND BREAST COMPLETE: CPT | Mod: 26

## 2019-02-25 PROCEDURE — 77065 DX MAMMO INCL CAD UNI: CPT | Mod: 26,GG,LT

## 2019-02-25 PROCEDURE — 77067 SCR MAMMO BI INCL CAD: CPT

## 2019-02-25 PROCEDURE — 77063 BREAST TOMOSYNTHESIS BI: CPT

## 2019-02-25 PROCEDURE — 77063 BREAST TOMOSYNTHESIS BI: CPT | Mod: 26,59

## 2019-02-25 PROCEDURE — 76641 ULTRASOUND BREAST COMPLETE: CPT

## 2019-02-25 PROCEDURE — 77067 SCR MAMMO BI INCL CAD: CPT | Mod: 26,59

## 2019-02-25 PROCEDURE — 77065 DX MAMMO INCL CAD UNI: CPT

## 2019-03-01 ENCOUNTER — APPOINTMENT (OUTPATIENT)
Dept: CARDIOLOGY | Facility: CLINIC | Age: 84
End: 2019-03-01
Payer: MEDICARE

## 2019-03-01 ENCOUNTER — NON-APPOINTMENT (OUTPATIENT)
Age: 84
End: 2019-03-01

## 2019-03-01 VITALS
WEIGHT: 106 LBS | OXYGEN SATURATION: 96 % | HEART RATE: 63 BPM | BODY MASS INDEX: 18.1 KG/M2 | SYSTOLIC BLOOD PRESSURE: 180 MMHG | HEIGHT: 64 IN | RESPIRATION RATE: 17 BRPM | DIASTOLIC BLOOD PRESSURE: 96 MMHG

## 2019-03-01 PROCEDURE — 93306 TTE W/DOPPLER COMPLETE: CPT

## 2019-03-01 PROCEDURE — 99215 OFFICE O/P EST HI 40 MIN: CPT

## 2019-03-01 PROCEDURE — 93000 ELECTROCARDIOGRAM COMPLETE: CPT

## 2019-03-05 ENCOUNTER — OUTPATIENT (OUTPATIENT)
Dept: OUTPATIENT SERVICES | Facility: HOSPITAL | Age: 84
LOS: 1 days | End: 2019-03-05
Payer: MEDICARE

## 2019-03-05 ENCOUNTER — RESULT REVIEW (OUTPATIENT)
Age: 84
End: 2019-03-05

## 2019-03-05 DIAGNOSIS — Z98.49 CATARACT EXTRACTION STATUS, UNSPECIFIED EYE: Chronic | ICD-10-CM

## 2019-03-05 DIAGNOSIS — N63.0 UNSPECIFIED LUMP IN UNSPECIFIED BREAST: ICD-10-CM

## 2019-03-05 DIAGNOSIS — R92.8 OTHER ABNORMAL AND INCONCLUSIVE FINDINGS ON DIAGNOSTIC IMAGING OF BREAST: ICD-10-CM

## 2019-03-05 DIAGNOSIS — Z90.710 ACQUIRED ABSENCE OF BOTH CERVIX AND UTERUS: Chronic | ICD-10-CM

## 2019-03-05 PROCEDURE — A4648: CPT

## 2019-03-05 PROCEDURE — 88305 TISSUE EXAM BY PATHOLOGIST: CPT | Mod: 26

## 2019-03-05 PROCEDURE — 88305 TISSUE EXAM BY PATHOLOGIST: CPT

## 2019-03-05 PROCEDURE — 88360 TUMOR IMMUNOHISTOCHEM/MANUAL: CPT | Mod: 26

## 2019-03-05 PROCEDURE — 88360 TUMOR IMMUNOHISTOCHEM/MANUAL: CPT

## 2019-03-05 PROCEDURE — 19083 BX BREAST 1ST LESION US IMAG: CPT

## 2019-03-05 PROCEDURE — 19083 BX BREAST 1ST LESION US IMAG: CPT | Mod: LT

## 2019-03-29 ENCOUNTER — RX RENEWAL (OUTPATIENT)
Age: 84
End: 2019-03-29

## 2019-06-21 ENCOUNTER — RX RENEWAL (OUTPATIENT)
Age: 84
End: 2019-06-21

## 2019-07-23 ENCOUNTER — NON-APPOINTMENT (OUTPATIENT)
Age: 84
End: 2019-07-23

## 2019-07-23 ENCOUNTER — APPOINTMENT (OUTPATIENT)
Dept: CARDIOLOGY | Facility: CLINIC | Age: 84
End: 2019-07-23
Payer: MEDICARE

## 2019-07-23 VITALS
HEIGHT: 64 IN | BODY MASS INDEX: 19.12 KG/M2 | WEIGHT: 112 LBS | HEART RATE: 81 BPM | OXYGEN SATURATION: 97 % | RESPIRATION RATE: 17 BRPM | SYSTOLIC BLOOD PRESSURE: 169 MMHG | DIASTOLIC BLOOD PRESSURE: 90 MMHG

## 2019-07-23 DIAGNOSIS — I49.9 CARDIAC ARRHYTHMIA, UNSPECIFIED: ICD-10-CM

## 2019-07-23 PROCEDURE — 93000 ELECTROCARDIOGRAM COMPLETE: CPT

## 2019-07-23 PROCEDURE — 99215 OFFICE O/P EST HI 40 MIN: CPT

## 2019-09-13 ENCOUNTER — RX RENEWAL (OUTPATIENT)
Age: 84
End: 2019-09-13

## 2019-11-19 ENCOUNTER — APPOINTMENT (OUTPATIENT)
Dept: CARDIOLOGY | Facility: CLINIC | Age: 84
End: 2019-11-19
Payer: MEDICARE

## 2019-11-19 ENCOUNTER — NON-APPOINTMENT (OUTPATIENT)
Age: 84
End: 2019-11-19

## 2019-11-19 VITALS
WEIGHT: 108 LBS | SYSTOLIC BLOOD PRESSURE: 173 MMHG | HEART RATE: 67 BPM | DIASTOLIC BLOOD PRESSURE: 93 MMHG | RESPIRATION RATE: 17 BRPM | OXYGEN SATURATION: 97 % | HEIGHT: 64 IN | BODY MASS INDEX: 18.44 KG/M2

## 2019-11-19 PROCEDURE — 99215 OFFICE O/P EST HI 40 MIN: CPT

## 2019-11-19 PROCEDURE — 93000 ELECTROCARDIOGRAM COMPLETE: CPT

## 2019-11-20 ENCOUNTER — OUTPATIENT (OUTPATIENT)
Dept: OUTPATIENT SERVICES | Facility: HOSPITAL | Age: 84
LOS: 1 days | End: 2019-11-20
Payer: MEDICARE

## 2019-11-20 ENCOUNTER — APPOINTMENT (OUTPATIENT)
Dept: ULTRASOUND IMAGING | Facility: HOSPITAL | Age: 84
End: 2019-11-20
Payer: MEDICARE

## 2019-11-20 ENCOUNTER — APPOINTMENT (OUTPATIENT)
Dept: MAMMOGRAPHY | Facility: HOSPITAL | Age: 84
End: 2019-11-20
Payer: MEDICARE

## 2019-11-20 DIAGNOSIS — Z00.8 ENCOUNTER FOR OTHER GENERAL EXAMINATION: ICD-10-CM

## 2019-11-20 DIAGNOSIS — Z98.49 CATARACT EXTRACTION STATUS, UNSPECIFIED EYE: Chronic | ICD-10-CM

## 2019-11-20 DIAGNOSIS — Z90.710 ACQUIRED ABSENCE OF BOTH CERVIX AND UTERUS: Chronic | ICD-10-CM

## 2019-11-20 PROCEDURE — 76641 ULTRASOUND BREAST COMPLETE: CPT | Mod: 26,LT

## 2019-11-20 PROCEDURE — G0279: CPT

## 2019-11-20 PROCEDURE — 76641 ULTRASOUND BREAST COMPLETE: CPT

## 2019-11-20 PROCEDURE — 77065 DX MAMMO INCL CAD UNI: CPT | Mod: 26,LT

## 2019-11-20 PROCEDURE — 77065 DX MAMMO INCL CAD UNI: CPT

## 2019-11-20 PROCEDURE — G0279: CPT | Mod: 26

## 2020-01-13 ENCOUNTER — RX RENEWAL (OUTPATIENT)
Age: 85
End: 2020-01-13

## 2020-02-26 ENCOUNTER — APPOINTMENT (OUTPATIENT)
Dept: ULTRASOUND IMAGING | Facility: HOSPITAL | Age: 85
End: 2020-02-26

## 2020-02-26 ENCOUNTER — APPOINTMENT (OUTPATIENT)
Dept: MAMMOGRAPHY | Facility: HOSPITAL | Age: 85
End: 2020-02-26

## 2020-02-26 ENCOUNTER — OUTPATIENT (OUTPATIENT)
Dept: OUTPATIENT SERVICES | Facility: HOSPITAL | Age: 85
LOS: 1 days | End: 2020-02-26
Payer: MEDICARE

## 2020-02-26 DIAGNOSIS — Z90.710 ACQUIRED ABSENCE OF BOTH CERVIX AND UTERUS: Chronic | ICD-10-CM

## 2020-02-26 DIAGNOSIS — Z98.49 CATARACT EXTRACTION STATUS, UNSPECIFIED EYE: Chronic | ICD-10-CM

## 2020-02-26 DIAGNOSIS — Z00.8 ENCOUNTER FOR OTHER GENERAL EXAMINATION: ICD-10-CM

## 2020-02-26 PROCEDURE — 76641 ULTRASOUND BREAST COMPLETE: CPT | Mod: 26,50

## 2020-02-26 PROCEDURE — 77066 DX MAMMO INCL CAD BI: CPT | Mod: 26

## 2020-02-26 PROCEDURE — 76641 ULTRASOUND BREAST COMPLETE: CPT

## 2020-02-26 PROCEDURE — G0279: CPT | Mod: 26

## 2020-02-26 PROCEDURE — 77066 DX MAMMO INCL CAD BI: CPT

## 2020-02-26 PROCEDURE — G0279: CPT

## 2020-03-17 ENCOUNTER — APPOINTMENT (OUTPATIENT)
Dept: CARDIOLOGY | Facility: CLINIC | Age: 85
End: 2020-03-17

## 2020-04-20 ENCOUNTER — RX RENEWAL (OUTPATIENT)
Age: 85
End: 2020-04-20

## 2020-06-16 ENCOUNTER — APPOINTMENT (OUTPATIENT)
Dept: ULTRASOUND IMAGING | Facility: HOSPITAL | Age: 85
End: 2020-06-16
Payer: MEDICARE

## 2020-06-16 ENCOUNTER — OUTPATIENT (OUTPATIENT)
Dept: OUTPATIENT SERVICES | Facility: HOSPITAL | Age: 85
LOS: 1 days | End: 2020-06-16
Payer: MEDICARE

## 2020-06-16 DIAGNOSIS — Z90.710 ACQUIRED ABSENCE OF BOTH CERVIX AND UTERUS: Chronic | ICD-10-CM

## 2020-06-16 DIAGNOSIS — Z00.8 ENCOUNTER FOR OTHER GENERAL EXAMINATION: ICD-10-CM

## 2020-06-16 DIAGNOSIS — Z98.49 CATARACT EXTRACTION STATUS, UNSPECIFIED EYE: Chronic | ICD-10-CM

## 2020-06-16 PROCEDURE — 76641 ULTRASOUND BREAST COMPLETE: CPT

## 2020-06-16 PROCEDURE — 76641 ULTRASOUND BREAST COMPLETE: CPT | Mod: 26,LT

## 2020-06-26 ENCOUNTER — RX RENEWAL (OUTPATIENT)
Age: 85
End: 2020-06-26

## 2020-09-08 ENCOUNTER — RX RENEWAL (OUTPATIENT)
Age: 85
End: 2020-09-08

## 2020-11-02 ENCOUNTER — RX RENEWAL (OUTPATIENT)
Age: 85
End: 2020-11-02

## 2020-11-10 ENCOUNTER — APPOINTMENT (OUTPATIENT)
Dept: CARDIOLOGY | Facility: CLINIC | Age: 85
End: 2020-11-10
Payer: MEDICARE

## 2020-11-10 ENCOUNTER — NON-APPOINTMENT (OUTPATIENT)
Age: 85
End: 2020-11-10

## 2020-11-10 VITALS
DIASTOLIC BLOOD PRESSURE: 93 MMHG | OXYGEN SATURATION: 99 % | RESPIRATION RATE: 19 BRPM | BODY MASS INDEX: 21.17 KG/M2 | SYSTOLIC BLOOD PRESSURE: 164 MMHG | HEART RATE: 69 BPM | WEIGHT: 124 LBS | HEIGHT: 64 IN | TEMPERATURE: 97.6 F

## 2020-11-10 PROCEDURE — 99072 ADDL SUPL MATRL&STAF TM PHE: CPT

## 2020-11-10 PROCEDURE — 99215 OFFICE O/P EST HI 40 MIN: CPT

## 2020-11-10 PROCEDURE — 93000 ELECTROCARDIOGRAM COMPLETE: CPT

## 2020-11-10 RX ORDER — METOPROLOL TARTRATE 25 MG/1
25 TABLET, FILM COATED ORAL TWICE DAILY
Qty: 180 | Refills: 1 | Status: DISCONTINUED | COMMUNITY
Start: 2018-01-29 | End: 2020-11-10

## 2020-11-10 RX ORDER — AMLODIPINE BESYLATE 5 MG/1
5 TABLET ORAL DAILY
Qty: 90 | Refills: 1 | Status: DISCONTINUED | COMMUNITY
Start: 2019-03-01 | End: 2020-11-10

## 2020-11-27 ENCOUNTER — TRANSCRIPTION ENCOUNTER (OUTPATIENT)
Age: 85
End: 2020-11-27

## 2020-12-14 ENCOUNTER — RX RENEWAL (OUTPATIENT)
Age: 85
End: 2020-12-14

## 2021-02-26 ENCOUNTER — OUTPATIENT (OUTPATIENT)
Dept: OUTPATIENT SERVICES | Facility: HOSPITAL | Age: 86
LOS: 1 days | End: 2021-02-26
Payer: MEDICARE

## 2021-02-26 ENCOUNTER — APPOINTMENT (OUTPATIENT)
Dept: ULTRASOUND IMAGING | Facility: HOSPITAL | Age: 86
End: 2021-02-26
Payer: MEDICARE

## 2021-02-26 ENCOUNTER — APPOINTMENT (OUTPATIENT)
Dept: MAMMOGRAPHY | Facility: HOSPITAL | Age: 86
End: 2021-02-26
Payer: MEDICARE

## 2021-02-26 DIAGNOSIS — Z90.710 ACQUIRED ABSENCE OF BOTH CERVIX AND UTERUS: Chronic | ICD-10-CM

## 2021-02-26 DIAGNOSIS — Z00.8 ENCOUNTER FOR OTHER GENERAL EXAMINATION: ICD-10-CM

## 2021-02-26 DIAGNOSIS — Z98.49 CATARACT EXTRACTION STATUS, UNSPECIFIED EYE: Chronic | ICD-10-CM

## 2021-02-26 PROCEDURE — 77066 DX MAMMO INCL CAD BI: CPT

## 2021-02-26 PROCEDURE — 76641 ULTRASOUND BREAST COMPLETE: CPT | Mod: 26,50

## 2021-02-26 PROCEDURE — 77066 DX MAMMO INCL CAD BI: CPT | Mod: 26

## 2021-02-26 PROCEDURE — G0279: CPT | Mod: 26

## 2021-02-26 PROCEDURE — 76641 ULTRASOUND BREAST COMPLETE: CPT

## 2021-02-26 PROCEDURE — G0279: CPT

## 2021-03-16 ENCOUNTER — NON-APPOINTMENT (OUTPATIENT)
Age: 86
End: 2021-03-16

## 2021-03-16 ENCOUNTER — APPOINTMENT (OUTPATIENT)
Dept: CARDIOLOGY | Facility: CLINIC | Age: 86
End: 2021-03-16
Payer: MEDICARE

## 2021-03-16 VITALS
HEART RATE: 69 BPM | BODY MASS INDEX: 21.68 KG/M2 | WEIGHT: 127 LBS | SYSTOLIC BLOOD PRESSURE: 160 MMHG | TEMPERATURE: 97.2 F | OXYGEN SATURATION: 98 % | DIASTOLIC BLOOD PRESSURE: 90 MMHG | RESPIRATION RATE: 20 BRPM | HEIGHT: 64 IN

## 2021-03-16 PROCEDURE — 93000 ELECTROCARDIOGRAM COMPLETE: CPT

## 2021-03-16 PROCEDURE — 93306 TTE W/DOPPLER COMPLETE: CPT

## 2021-03-16 PROCEDURE — 99072 ADDL SUPL MATRL&STAF TM PHE: CPT

## 2021-03-16 PROCEDURE — 99214 OFFICE O/P EST MOD 30 MIN: CPT

## 2021-06-03 ENCOUNTER — LABORATORY RESULT (OUTPATIENT)
Age: 86
End: 2021-06-03

## 2021-06-03 ENCOUNTER — APPOINTMENT (OUTPATIENT)
Dept: OBGYN | Facility: CLINIC | Age: 86
End: 2021-06-03
Payer: MEDICARE

## 2021-06-03 VITALS
DIASTOLIC BLOOD PRESSURE: 82 MMHG | RESPIRATION RATE: 20 BRPM | OXYGEN SATURATION: 98 % | SYSTOLIC BLOOD PRESSURE: 138 MMHG | HEIGHT: 64 IN | HEART RATE: 77 BPM | BODY MASS INDEX: 20.83 KG/M2 | TEMPERATURE: 96.4 F | WEIGHT: 122 LBS

## 2021-06-03 DIAGNOSIS — N76.0 ACUTE VAGINITIS: ICD-10-CM

## 2021-06-03 PROCEDURE — 99397 PER PM REEVAL EST PAT 65+ YR: CPT

## 2021-06-03 PROCEDURE — 99072 ADDL SUPL MATRL&STAF TM PHE: CPT

## 2021-06-03 RX ORDER — NYSTATIN AND TRIAMCINOLONE ACETONIDE 100000; 1 [USP'U]/G; MG/G
100000-0.1 OINTMENT TOPICAL TWICE DAILY
Qty: 1 | Refills: 0 | Status: ACTIVE | COMMUNITY
Start: 2021-06-03 | End: 1900-01-01

## 2021-06-03 RX ORDER — FLUCONAZOLE 150 MG/1
150 TABLET ORAL
Qty: 2 | Refills: 0 | Status: ACTIVE | COMMUNITY
Start: 2021-06-03 | End: 1900-01-01

## 2021-06-03 NOTE — HISTORY OF PRESENT ILLNESS
[N] : Patient is not sexually active [Rt Vulva] : right vulva [Lt Vulva] : left vulva [Suprapubic] : suprapubic [Vagina] : vagina [Acute] : acute [Cold Compress] : cold compress [Urination] : urination [Heat] : heat [Sweat] : sweat [FreeTextEntry1] : 86 yo with vulvovaginitis for the last 3 weeks. Patient endorses using new dove soap and wears pantyliners religiously. Does not wear cotton underwear--silk/satin. Tried using monistat the other night, however most of it fell out when she got up in the middle of the night to help her . [TextBox_19] : UTD per patient--doctor from [facility] [TextBox_31] : graduate, sp total hysterectomy [TextBox_43] : UTD per patient--doctor from [facility]

## 2021-06-03 NOTE — PHYSICAL EXAM
[Appropriately responsive] : appropriately responsive [Alert] : alert [No Acute Distress] : no acute distress [Soft] : soft [Non-tender] : non-tender [Non-distended] : non-distended [No HSM] : No HSM [No Lesions] : no lesions [No Mass] : no mass [Oriented x3] : oriented x3 [Examination Of The Breasts] : a normal appearance [No Masses] : no breast masses were palpable [Vulvitis] : vulvitis [Labia Majora Erythema] : erythema [Labia Minora Erythema] : erythema [Discharge] : a  ~M vaginal discharge was present [Moderate] : moderate [White] : white [Thick] : thick [Normal] : normal [Uterine Adnexae] : normal

## 2021-06-03 NOTE — PLAN
[FreeTextEntry1] : Health Care Maintenance\par health screening UTD\par \par Vulvovaginitis\par - Nystatin/triamcinolone cream escribed to patient--directions given\par - Fluconazole escribed to patient--if symptoms not better in three days, may take another pill\par - RTC PRN vs in one year\par \par Bety Shelby MD\par \par I spent the time noted on the day of this patient encounter preparing for, providing and documenting the above E/M service and counseling and educate patient on differential, workup, disease course, and treatment/management. Education was provided to the patient during this encounter. All questions and concerns were answered and addressed in detail.\par

## 2021-06-03 NOTE — COUNSELING
[Nutrition/ Exercise/ Weight Management] : nutrition, exercise, weight management [Breast Self Exam] : breast self exam [Bladder Hygiene] : bladder hygiene [Confidentiality] : confidentiality [STD (testing, results, tx)] : STD (testing, results, tx) [Medication Management] : medication management

## 2021-06-04 LAB
APPEARANCE: ABNORMAL
BILIRUBIN URINE: NEGATIVE
BLOOD URINE: NEGATIVE
COLOR: YELLOW
GLUCOSE QUALITATIVE U: NEGATIVE
KETONES URINE: NEGATIVE
LEUKOCYTE ESTERASE URINE: NEGATIVE
NITRITE URINE: NEGATIVE
PH URINE: 8.5
PROTEIN URINE: NORMAL
SPECIFIC GRAVITY URINE: 1.01
UROBILINOGEN URINE: NORMAL

## 2021-06-06 RX ORDER — METRONIDAZOLE 7.5 MG/G
0.75 GEL VAGINAL
Qty: 1 | Refills: 0 | Status: ACTIVE | COMMUNITY
Start: 2018-08-17 | End: 1900-01-01

## 2021-06-08 DIAGNOSIS — R82.90 UNSPECIFIED ABNORMAL FINDINGS IN URINE: ICD-10-CM

## 2021-06-09 LAB
APPEARANCE: CLEAR
BILIRUBIN URINE: NEGATIVE
BLOOD URINE: NEGATIVE
COLOR: NORMAL
GLUCOSE QUALITATIVE U: NEGATIVE
KETONES URINE: NEGATIVE
LEUKOCYTE ESTERASE URINE: NEGATIVE
NITRITE URINE: NEGATIVE
PH URINE: 6.5
PROTEIN URINE: NEGATIVE
SPECIFIC GRAVITY URINE: 1
UROBILINOGEN URINE: NORMAL

## 2021-09-14 ENCOUNTER — APPOINTMENT (OUTPATIENT)
Dept: CARDIOLOGY | Facility: CLINIC | Age: 86
End: 2021-09-14
Payer: MEDICARE

## 2021-09-14 ENCOUNTER — NON-APPOINTMENT (OUTPATIENT)
Age: 86
End: 2021-09-14

## 2021-09-14 VITALS
WEIGHT: 130 LBS | RESPIRATION RATE: 19 BRPM | HEART RATE: 67 BPM | SYSTOLIC BLOOD PRESSURE: 160 MMHG | DIASTOLIC BLOOD PRESSURE: 90 MMHG | BODY MASS INDEX: 22.2 KG/M2 | HEIGHT: 64 IN | OXYGEN SATURATION: 95 % | TEMPERATURE: 96.4 F

## 2021-09-14 DIAGNOSIS — I34.1 NONRHEUMATIC MITRAL (VALVE) PROLAPSE: ICD-10-CM

## 2021-09-14 PROCEDURE — 93000 ELECTROCARDIOGRAM COMPLETE: CPT

## 2021-09-14 PROCEDURE — 99214 OFFICE O/P EST MOD 30 MIN: CPT

## 2021-11-03 ENCOUNTER — RX RENEWAL (OUTPATIENT)
Age: 86
End: 2021-11-03

## 2022-02-28 ENCOUNTER — OUTPATIENT (OUTPATIENT)
Dept: OUTPATIENT SERVICES | Facility: HOSPITAL | Age: 87
LOS: 1 days | End: 2022-02-28
Payer: COMMERCIAL

## 2022-02-28 ENCOUNTER — APPOINTMENT (OUTPATIENT)
Dept: ULTRASOUND IMAGING | Facility: HOSPITAL | Age: 87
End: 2022-02-28
Payer: MEDICARE

## 2022-02-28 ENCOUNTER — APPOINTMENT (OUTPATIENT)
Dept: MAMMOGRAPHY | Facility: HOSPITAL | Age: 87
End: 2022-02-28
Payer: MEDICARE

## 2022-02-28 DIAGNOSIS — Z98.49 CATARACT EXTRACTION STATUS, UNSPECIFIED EYE: Chronic | ICD-10-CM

## 2022-02-28 DIAGNOSIS — Z90.710 ACQUIRED ABSENCE OF BOTH CERVIX AND UTERUS: Chronic | ICD-10-CM

## 2022-02-28 DIAGNOSIS — Z00.8 ENCOUNTER FOR OTHER GENERAL EXAMINATION: ICD-10-CM

## 2022-02-28 PROCEDURE — G0279: CPT | Mod: 26

## 2022-02-28 PROCEDURE — 76641 ULTRASOUND BREAST COMPLETE: CPT | Mod: 26,50

## 2022-02-28 PROCEDURE — 76641 ULTRASOUND BREAST COMPLETE: CPT

## 2022-02-28 PROCEDURE — 77066 DX MAMMO INCL CAD BI: CPT | Mod: 26

## 2022-02-28 PROCEDURE — G0279: CPT

## 2022-02-28 PROCEDURE — 77066 DX MAMMO INCL CAD BI: CPT

## 2022-03-15 ENCOUNTER — APPOINTMENT (OUTPATIENT)
Dept: CARDIOLOGY | Facility: CLINIC | Age: 87
End: 2022-03-15
Payer: MEDICARE

## 2022-03-15 ENCOUNTER — NON-APPOINTMENT (OUTPATIENT)
Age: 87
End: 2022-03-15

## 2022-03-15 VITALS
BODY MASS INDEX: 22.2 KG/M2 | TEMPERATURE: 96.3 F | RESPIRATION RATE: 19 BRPM | WEIGHT: 130 LBS | SYSTOLIC BLOOD PRESSURE: 168 MMHG | HEART RATE: 76 BPM | OXYGEN SATURATION: 99 % | DIASTOLIC BLOOD PRESSURE: 93 MMHG | HEIGHT: 64 IN

## 2022-03-15 PROCEDURE — 99215 OFFICE O/P EST HI 40 MIN: CPT

## 2022-03-15 PROCEDURE — 93306 TTE W/DOPPLER COMPLETE: CPT

## 2022-03-15 PROCEDURE — 93000 ELECTROCARDIOGRAM COMPLETE: CPT

## 2022-05-04 ENCOUNTER — RX RENEWAL (OUTPATIENT)
Age: 87
End: 2022-05-04

## 2022-08-15 ENCOUNTER — RX RENEWAL (OUTPATIENT)
Age: 87
End: 2022-08-15

## 2022-09-23 ENCOUNTER — OUTPATIENT (OUTPATIENT)
Dept: OUTPATIENT SERVICES | Facility: HOSPITAL | Age: 87
LOS: 1 days | End: 2022-09-23
Payer: MEDICARE

## 2022-09-23 ENCOUNTER — APPOINTMENT (OUTPATIENT)
Dept: RADIOLOGY | Facility: HOSPITAL | Age: 87
End: 2022-09-23

## 2022-09-23 DIAGNOSIS — Z90.710 ACQUIRED ABSENCE OF BOTH CERVIX AND UTERUS: Chronic | ICD-10-CM

## 2022-09-23 DIAGNOSIS — Z98.49 CATARACT EXTRACTION STATUS, UNSPECIFIED EYE: Chronic | ICD-10-CM

## 2022-09-23 DIAGNOSIS — Z00.8 ENCOUNTER FOR OTHER GENERAL EXAMINATION: ICD-10-CM

## 2022-09-23 PROCEDURE — 73130 X-RAY EXAM OF HAND: CPT | Mod: 26,50

## 2022-09-23 PROCEDURE — 73130 X-RAY EXAM OF HAND: CPT

## 2022-09-27 ENCOUNTER — APPOINTMENT (OUTPATIENT)
Dept: CARDIOLOGY | Facility: CLINIC | Age: 87
End: 2022-09-27

## 2022-09-27 ENCOUNTER — NON-APPOINTMENT (OUTPATIENT)
Age: 87
End: 2022-09-27

## 2022-09-27 VITALS
SYSTOLIC BLOOD PRESSURE: 180 MMHG | DIASTOLIC BLOOD PRESSURE: 90 MMHG | HEART RATE: 62 BPM | BODY MASS INDEX: 19.81 KG/M2 | RESPIRATION RATE: 18 BRPM | OXYGEN SATURATION: 96 % | HEIGHT: 64 IN | WEIGHT: 116 LBS

## 2022-09-27 DIAGNOSIS — R00.2 PALPITATIONS: ICD-10-CM

## 2022-09-27 PROCEDURE — 93000 ELECTROCARDIOGRAM COMPLETE: CPT

## 2022-09-27 PROCEDURE — 99215 OFFICE O/P EST HI 40 MIN: CPT | Mod: 25

## 2022-12-01 ENCOUNTER — APPOINTMENT (OUTPATIENT)
Dept: ULTRASOUND IMAGING | Facility: HOSPITAL | Age: 87
End: 2022-12-01

## 2022-12-01 ENCOUNTER — OUTPATIENT (OUTPATIENT)
Dept: OUTPATIENT SERVICES | Facility: HOSPITAL | Age: 87
LOS: 1 days | End: 2022-12-01
Payer: MEDICARE

## 2022-12-01 ENCOUNTER — APPOINTMENT (OUTPATIENT)
Dept: MAMMOGRAPHY | Facility: HOSPITAL | Age: 87
End: 2022-12-01

## 2022-12-01 DIAGNOSIS — Z00.8 ENCOUNTER FOR OTHER GENERAL EXAMINATION: ICD-10-CM

## 2022-12-01 DIAGNOSIS — Z90.710 ACQUIRED ABSENCE OF BOTH CERVIX AND UTERUS: Chronic | ICD-10-CM

## 2022-12-01 DIAGNOSIS — Z98.49 CATARACT EXTRACTION STATUS, UNSPECIFIED EYE: Chronic | ICD-10-CM

## 2022-12-01 PROCEDURE — G0279: CPT

## 2022-12-01 PROCEDURE — G0279: CPT | Mod: 26

## 2022-12-01 PROCEDURE — 76641 ULTRASOUND BREAST COMPLETE: CPT

## 2022-12-01 PROCEDURE — 76641 ULTRASOUND BREAST COMPLETE: CPT | Mod: 26,LT

## 2022-12-01 PROCEDURE — 77065 DX MAMMO INCL CAD UNI: CPT | Mod: 26,LT

## 2022-12-01 PROCEDURE — 77065 DX MAMMO INCL CAD UNI: CPT

## 2023-01-27 ENCOUNTER — RX RENEWAL (OUTPATIENT)
Age: 88
End: 2023-01-27

## 2023-01-27 RX ORDER — METOPROLOL TARTRATE 25 MG/1
25 TABLET, FILM COATED ORAL
Qty: 90 | Refills: 4 | Status: ACTIVE | COMMUNITY
Start: 2018-07-20 | End: 1900-01-01

## 2023-01-30 ENCOUNTER — EMERGENCY (EMERGENCY)
Facility: HOSPITAL | Age: 88
LOS: 1 days | Discharge: HOME | End: 2023-01-30
Attending: EMERGENCY MEDICINE | Admitting: EMERGENCY MEDICINE
Payer: MEDICARE

## 2023-01-30 VITALS
HEART RATE: 86 BPM | DIASTOLIC BLOOD PRESSURE: 111 MMHG | WEIGHT: 119.05 LBS | TEMPERATURE: 97 F | SYSTOLIC BLOOD PRESSURE: 170 MMHG | OXYGEN SATURATION: 97 % | HEIGHT: 64 IN | RESPIRATION RATE: 17 BRPM

## 2023-01-30 VITALS
SYSTOLIC BLOOD PRESSURE: 169 MMHG | OXYGEN SATURATION: 99 % | HEART RATE: 84 BPM | DIASTOLIC BLOOD PRESSURE: 100 MMHG | RESPIRATION RATE: 18 BRPM

## 2023-01-30 DIAGNOSIS — Z90.710 ACQUIRED ABSENCE OF BOTH CERVIX AND UTERUS: Chronic | ICD-10-CM

## 2023-01-30 DIAGNOSIS — Z98.49 CATARACT EXTRACTION STATUS, UNSPECIFIED EYE: Chronic | ICD-10-CM

## 2023-01-30 PROCEDURE — 30901 CONTROL OF NOSEBLEED: CPT

## 2023-01-30 PROCEDURE — 30901 CONTROL OF NOSEBLEED: CPT | Mod: RT

## 2023-01-30 PROCEDURE — 99283 EMERGENCY DEPT VISIT LOW MDM: CPT | Mod: 25

## 2023-01-30 PROCEDURE — 99284 EMERGENCY DEPT VISIT MOD MDM: CPT | Mod: 25

## 2023-01-30 NOTE — ED PROVIDER NOTE - CLINICAL SUMMARY MEDICAL DECISION MAKING FREE TEXT BOX
87-year-old female with a medical history including hypertension complaining of epistaxis from the right nostril that started just prior to arrival and was not controlled after about 15 minutes patient did call 911.  Patient reports in the last week she has had 50 had 3 nosebleeds 3 days in a row.  That were easily controlled.  And then for the last 3 or 4 days no nosebleeds, Contributed to dry air.  Patient denies any trauma or injury.  No difficulty breathing or difficulty swallowing.  Patient denies any anticoagulation or antiplatelet use.  Denies any other complaints, no recent illness.    Single focal spot of oozing bleeding noted on visual exam of right anterior septum.  Posterior oropharynx clear, inferior and middle turbinates inflamed.  No active rhinorrhea.  Successfully cauterized with silver nitrate.  No active bleeding on reinspection prior to discharge.  Patient agrees with plan to discharge home with follow-up with PMD and ENT as needed. 87-year-old female with a medical history including hypertension complaining of epistaxis from the right nostril that started just prior to arrival and was not controlled after about 15 minutes patient called 911.  Patient reports in the last week she has had 3 nosebleeds 3 days in a row that were easily controlled, and then for the last 3 or 4 days no nosebleeds, attributed nosebleeds to heat and dry air.  Patient denies any trauma or injury.  No difficulty breathing or difficulty swallowing.  Patient denies any anticoagulation or antiplatelet use.  Denies any other complaints.    Single focal spot of oozing bleeding noted on visual exam of right anterior septum.  Posterior oropharynx clear, inferior and middle turbinates inflamed.  No active rhinorrhea.  Successfully cauterized with silver nitrate.  No active bleeding on reinspection prior to discharge.  Patient agrees with plan to discharge home with follow-up with PMD and ENT as needed. Daniel: 87-year-old female with a medical history including hypertension complaining of epistaxis from the right nostril that started just prior to arrival and was not controlled after about 15 minutes patient called 911.  Patient reports in the last week she has had 3 nosebleeds 3 days in a row that were easily controlled, and then for the last 3 or 4 days no nosebleeds, attributed nosebleeds to heat and dry air.  Patient denies any trauma or injury.  No difficulty breathing or difficulty swallowing.  Patient denies any anticoagulation or antiplatelet use.  Denies any other complaints.    Single focal spot of oozing bleeding noted on visual exam of right anterior septum.  Posterior oropharynx clear, inferior and middle turbinates inflamed.  No active rhinorrhea.  Successfully cauterized with silver nitrate.  No active bleeding on reinspection prior to discharge.  Patient agrees with plan to discharge home with follow-up with PMD and ENT as needed.

## 2023-01-30 NOTE — ED ADULT NURSE NOTE - TEMPLATE
Both patients are very much over due for well visits. Can be discussed and drawn at that time.   EENMT

## 2023-01-30 NOTE — ED ADULT NURSE NOTE - NSIMPLEMENTINTERV_GEN_ALL_ED
Implemented All Fall with Harm Risk Interventions:  [place] to call system. Call bell, personal items and telephone within reach. Instruct patient to call for assistance. Room bathroom lighting operational. Non-slip footwear when patient is off stretcher. Physically safe environment: no spills, clutter or unnecessary equipment. Stretcher in lowest position, wheels locked, appropriate side rails in place. Provide visual cue, wrist band, yellow gown, etc. Monitor gait and stability. Monitor for mental status changes and reorient to person, place, and time. Review medications for side effects contributing to fall risk. Reinforce activity limits and safety measures with patient and family. Provide visual clues: red socks.

## 2023-01-30 NOTE — ED PROVIDER NOTE - NSFOLLOWUPINSTRUCTIONS_ED_ALL_ED_FT
Follow up with your doctor.  Follow up with an ENT as needed or directed by your doctor.    Do Not put anything up your nose or forcefully blow your nose.    Humidification as well as nasal saline or         Nosebleed    WHAT YOU NEED TO KNOW:    A nosebleed, or epistaxis, occurs when one or more of the blood vessels in your nose break. You may have dark or bright red blood from one or both nostrils. A nosebleed is most commonly caused by dry air or picking your nose. A direct blow to your nose, irritation from a cold or allergies, or a foreign object can also cause a nosebleed.     DISCHARGE INSTRUCTIONS:    Return to the emergency department if:   •Your nasal packing is soaked with blood.    •Your nose is still bleeding after 20 minutes, even after you pinch it.     •You have a foul-smelling discharge coming out of your nose.    •You feel so weak and dizzy that you have trouble standing up.    •You have trouble breathing or talking.     Contact your healthcare provider if:   •You have a fever and are vomiting.    •You have pain in and around your nose that is getting worse even after you take pain medicines.    •You have questions or concerns about your condition or care.    First aid:   •Sit up and lean forward. This will help prevent you from swallowing blood. Spit blood and saliva into a bowl.     •Apply pressure to your nose. Use 2 fingers to pinch your nose shut for 10       •Apply ice on the bridge of your nose to decrease swelling and bleeding. Use a cold pack or put crushed ice in a plastic bag. Cover it with a towel to protect your skin.      •Pack your nose with a cotton ball, tissue, tampon, or gauze bandage to stop the bleeding.      Medicines:   •Medicines applied to a small piece of cotton and placed in your nose. Medicine may also be sprayed in or applied directly to your nose. You may need medicine to prevent an infection. If bleeding is severe, medicine may be injected into a blood vessel in your nose.       •Take your medicine as directed. Contact your healthcare provider if you think your medicine is not helping or if you have side effects. Tell your provider if you are allergic to any medicine. Keep a list of the medicines, vitamins, and herbs you take. Include the amounts, and when and why you take them. Bring the list or the pill bottles to follow-up visits. Carry your medicine list with you in case of an emergency.      Prevent another nosebleed:   •Keep your nose moist. Put a small amount of petroleum jelly inside your nostrils as needed. Use a saline (saltwater) nasal spray. Do not put anything else inside your nose unless your healthcare provider says it is okay. Do not use oil-based lubricants if you use oxygen therapy. They may be flammable.      •Use a cool mist humidifier to increase air moisture in your home. This will help your nose stay moist.       •Do not pick or blow your nose for at least a week. You can irritate or damage your nose if you pick it. Blowing your nose too hard may cause the bleeding to start again. Do not bend over or strain as this can cause the bleeding to start again.      •Avoid irritants such as tobacco smoke or chemical sprays such as .      Follow up with your healthcare provider as directed: Any packing in your nose should be removed within 2 to 3 days. Write down your questions so you remember to ask them during your visits.

## 2023-01-30 NOTE — ED PROVIDER NOTE - PATIENT PORTAL LINK FT
You can access the FollowMyHealth Patient Portal offered by [facility] by registering at the following website: [email or web address]. By joining [employer]’s FollowMyHealth portal, you will also be able to view your health information using other applications (apps) compatible with our system.

## 2023-01-30 NOTE — ED PROVIDER NOTE - OBJECTIVE STATEMENT
87-year-old female with a medical history including hypertension complaining of epistaxis from the right nostril that started just prior to arrival and was not controlled after about 15 minutes patient did call 911.  Patient reports in the last week she has had 50 had 3 nosebleeds 3 days in a row.  That were easily controlled.  And then for the last 3 or 4 days no nosebleeds, Contributed to dry air.  Patient denies any trauma or injury.  No difficulty breathing or difficulty swallowing.  Patient denies any anticoagulation or antiplatelet use.  Denies any other complaints. 87-year-old female with a medical history including hypertension complaining of epistaxis from the right nostril that started just prior to arrival and was not controlled after about 15 minutes patient called 911.  Patient reports in the last week she has had 3 nosebleeds 3 days in a row that were easily controlled, and then for the last 3 or 4 days no nosebleeds, attributed nosebleeds to heat and dry air.  Patient denies any trauma or injury.  No difficulty breathing or difficulty swallowing.  Patient denies any anticoagulation or antiplatelet use.  Denies any other complaints.

## 2023-01-30 NOTE — ED PROVIDER NOTE - CARE PROVIDER_API CALL
Edmundo Reyes)  Otolaryngology  [address] [place] [address], [place] [address]  Phone: [phone]  Fax: [phone]  Follow Up Time:

## 2023-01-30 NOTE — ED PROVIDER NOTE - NOSE FINDINGS
Right nares with inflammation, single focal spot approx. [id number] of oozing blood noted anterior septum, no septal hematoma, no ulcerations or drainage noted./INFLAMMATION

## 2023-01-30 NOTE — ED PROVIDER NOTE - NS ED ATTENDING STATEMENT MOD
This was a shared visit with the ROWENA. I reviewed and verified the documentation and independently performed the documented:

## 2023-01-30 NOTE — ED ADULT NURSE NOTE - NSICDXPASTMEDICALHX_GEN_ALL_CORE_FT
PAST MEDICAL HISTORY:  Arrhythmia s/p ablation-2003    HLD (hyperlipidemia)     Hypertension     UTI (urinary tract infection) chronic

## 2023-01-30 NOTE — ED ADULT NURSE NOTE - OBJECTIVE STATEMENT
Pt BIB EMS from home for c/o right sided nose bleed PTA and Pt BIB EMS from home for c/o right sided nose bleed PTA and HTN. Pt states that she had nose bleed today and it was not controlled for 15 mins, prompting her to call 911. Pt reports that she has had 3 recent nose bleeds within the past week, that were easily controlled. Pt denies taking blood thinners.  Pt states that the nose bleeds have been happening because of the dry air, and that shes tried to use a saline spray with no relief. Pt with PMH HTN, states that she took her BP meds PTA. Pt denies headaches, vision changes, numbness/tingling or weakness. On arrival, pt with a BP= 170/111, HR= 86.

## 2023-01-30 NOTE — ED ADULT NURSE NOTE - NSICDXFAMILYHX_GEN_ALL_CORE_FT
FAMILY HISTORY:  Family history of aortic aneurysm, father  Family history of cardiomegaly, mother- age 93

## 2023-03-14 ENCOUNTER — APPOINTMENT (OUTPATIENT)
Dept: ULTRASOUND IMAGING | Facility: HOSPITAL | Age: 88
End: 2023-03-14
Payer: MEDICARE

## 2023-03-14 ENCOUNTER — APPOINTMENT (OUTPATIENT)
Dept: MAMMOGRAPHY | Facility: HOSPITAL | Age: 88
End: 2023-03-14
Payer: MEDICARE

## 2023-03-14 ENCOUNTER — OUTPATIENT (OUTPATIENT)
Dept: OUTPATIENT SERVICES | Facility: HOSPITAL | Age: 88
LOS: 1 days | End: 2023-03-14
Payer: MEDICARE

## 2023-03-14 DIAGNOSIS — Z90.710 ACQUIRED ABSENCE OF BOTH CERVIX AND UTERUS: Chronic | ICD-10-CM

## 2023-03-14 DIAGNOSIS — Z00.8 ENCOUNTER FOR OTHER GENERAL EXAMINATION: ICD-10-CM

## 2023-03-14 DIAGNOSIS — Z98.49 CATARACT EXTRACTION STATUS, UNSPECIFIED EYE: Chronic | ICD-10-CM

## 2023-03-14 PROCEDURE — 76641 ULTRASOUND BREAST COMPLETE: CPT | Mod: 26,50

## 2023-03-14 PROCEDURE — G0279: CPT | Mod: 26

## 2023-03-14 PROCEDURE — 76641 ULTRASOUND BREAST COMPLETE: CPT

## 2023-03-14 PROCEDURE — 77066 DX MAMMO INCL CAD BI: CPT

## 2023-03-14 PROCEDURE — 77066 DX MAMMO INCL CAD BI: CPT | Mod: 26

## 2023-03-14 PROCEDURE — G0279: CPT

## 2023-03-28 ENCOUNTER — APPOINTMENT (OUTPATIENT)
Dept: CARDIOLOGY | Facility: CLINIC | Age: 88
End: 2023-03-28
Payer: MEDICARE

## 2023-03-28 ENCOUNTER — NON-APPOINTMENT (OUTPATIENT)
Age: 88
End: 2023-03-28

## 2023-03-28 VITALS
HEIGHT: 64 IN | TEMPERATURE: 96.3 F | SYSTOLIC BLOOD PRESSURE: 170 MMHG | RESPIRATION RATE: 20 BRPM | WEIGHT: 115 LBS | DIASTOLIC BLOOD PRESSURE: 83 MMHG | OXYGEN SATURATION: 96 % | BODY MASS INDEX: 19.63 KG/M2 | HEART RATE: 65 BPM

## 2023-03-28 PROCEDURE — 93000 ELECTROCARDIOGRAM COMPLETE: CPT

## 2023-03-28 PROCEDURE — 99215 OFFICE O/P EST HI 40 MIN: CPT | Mod: 25

## 2023-03-28 NOTE — REVIEW OF SYSTEMS
[Feeling Fatigued] : feeling fatigued [Palpitations] : palpitations [Joint Pain] : joint pain [Joint Stiffness] : joint stiffness [Under Stress] : under stress [Negative] : Heme/Lymph

## 2023-03-28 NOTE — REASON FOR VISIT
[FreeTextEntry1] : Cardiology follow-up visit for evaluation management of hypertension, hyperlipidemia, palpitations, CAD, aortic aneurysm.\par \par

## 2023-03-28 NOTE — CARDIOLOGY SUMMARY
[___] : [unfilled] [de-identified] : March 28, 2023. Sinus  Rhythm  - frequent PAC s \par # PACs = 3.\par -RSR(V1) -nondiagnostic. \par \par PROBABLY NORMAL\par  [de-identified] : March 15 2022. Normal LV function. Mild Ao root enlargement. Concentric left ventricular remodeling

## 2023-03-28 NOTE — PHYSICAL EXAM
[Frail] : frail [Normal S1, S2] : normal S1, S2 [No Rub] : no rub [No Gallop] : no gallop [Murmur] : murmur [Normal] : alert and oriented, normal memory [de-identified] : ll/Vl systolic

## 2023-03-28 NOTE — DISCUSSION/SUMMARY
[FreeTextEntry1] : 87-year-old woman with hypertension, hyperlipidemia, palpitations, CAD, aortic aneurysm.\par She is minimally symptomatic with respect to cardiac issues, rare palpitations.\par Blood pressure stable.  There is no JVD.  Lungs are clear.  No edema.  She is euvolemic.\par EKG is sinus rhythm.  APCs.\par \par Plan\par 1.  Current medication list is reviewed, no changes.\par 2.  Echocardiogram to evaluate for structural heart disease, evaluate aortic root size.\par 3.  Next visit with me in the office in 6 months.\par 4.  Advised her to monitor for any cardiac symptoms and to notify me for any changes or if she has any other concerns.  Cardiac issues were discussed, all questions answered.\par

## 2023-03-28 NOTE — HISTORY OF PRESENT ILLNESS
[FreeTextEntry1] : Since last visit with me, she has been at her baseline.\par Continues to have increased personal stress related to the health of her .  He is currently at home, and she does have home health aide.\par She is able to manage activities around her house, and does go out for errands.\par Very rare episodes of palpitations.\par No complaints of chest pain or chest pressure.  No shortness of breath.  No dyspnea on exertion.  Denies dizziness, lightheadedness, syncope or near syncope.  No edema, no orthopnea.  No PND.\par \par

## 2023-04-17 ENCOUNTER — APPOINTMENT (OUTPATIENT)
Dept: CARDIOLOGY | Facility: CLINIC | Age: 88
End: 2023-04-17
Payer: MEDICARE

## 2023-04-17 PROCEDURE — 93306 TTE W/DOPPLER COMPLETE: CPT

## 2023-09-14 ENCOUNTER — APPOINTMENT (OUTPATIENT)
Dept: RADIOLOGY | Facility: HOSPITAL | Age: 88
End: 2023-09-14
Payer: MEDICARE

## 2023-09-14 ENCOUNTER — OUTPATIENT (OUTPATIENT)
Dept: OUTPATIENT SERVICES | Facility: HOSPITAL | Age: 88
LOS: 1 days | End: 2023-09-14
Payer: MEDICARE

## 2023-09-14 DIAGNOSIS — Z98.49 CATARACT EXTRACTION STATUS, UNSPECIFIED EYE: Chronic | ICD-10-CM

## 2023-09-14 DIAGNOSIS — Z90.710 ACQUIRED ABSENCE OF BOTH CERVIX AND UTERUS: Chronic | ICD-10-CM

## 2023-09-14 DIAGNOSIS — Z00.8 ENCOUNTER FOR OTHER GENERAL EXAMINATION: ICD-10-CM

## 2023-09-14 PROCEDURE — 77080 DXA BONE DENSITY AXIAL: CPT | Mod: 26

## 2023-09-14 PROCEDURE — 77080 DXA BONE DENSITY AXIAL: CPT

## 2023-09-26 ENCOUNTER — APPOINTMENT (OUTPATIENT)
Dept: CARDIOLOGY | Facility: CLINIC | Age: 88
End: 2023-09-26
Payer: MEDICARE

## 2023-09-26 ENCOUNTER — NON-APPOINTMENT (OUTPATIENT)
Age: 88
End: 2023-09-26

## 2023-09-26 VITALS
HEART RATE: 71 BPM | RESPIRATION RATE: 20 BRPM | BODY MASS INDEX: 19.63 KG/M2 | DIASTOLIC BLOOD PRESSURE: 85 MMHG | WEIGHT: 115 LBS | SYSTOLIC BLOOD PRESSURE: 144 MMHG | TEMPERATURE: 94.3 F | HEIGHT: 64 IN | OXYGEN SATURATION: 97 %

## 2023-09-26 DIAGNOSIS — I49.1 ATRIAL PREMATURE DEPOLARIZATION: ICD-10-CM

## 2023-09-26 DIAGNOSIS — R06.09 OTHER FORMS OF DYSPNEA: ICD-10-CM

## 2023-09-26 PROCEDURE — 93000 ELECTROCARDIOGRAM COMPLETE: CPT

## 2023-09-26 PROCEDURE — 99215 OFFICE O/P EST HI 40 MIN: CPT | Mod: 25

## 2024-02-03 NOTE — ED ADULT TRIAGE NOTE - MEANS OF ARRIVAL
ambulatory Detail Level: Zone Continue Regimen: Minocycline (refills sent), spironolactone, and topicals. Tretinoin and clindamycin. Render In Strict Bullet Format?: No

## 2024-03-26 ENCOUNTER — APPOINTMENT (OUTPATIENT)
Dept: CARDIOLOGY | Facility: CLINIC | Age: 89
End: 2024-03-26

## 2024-04-29 ENCOUNTER — NON-APPOINTMENT (OUTPATIENT)
Age: 89
End: 2024-04-29

## 2024-04-29 ENCOUNTER — APPOINTMENT (OUTPATIENT)
Dept: CARDIOLOGY | Facility: CLINIC | Age: 89
End: 2024-04-29
Payer: MEDICARE

## 2024-04-29 VITALS
DIASTOLIC BLOOD PRESSURE: 78 MMHG | SYSTOLIC BLOOD PRESSURE: 140 MMHG | OXYGEN SATURATION: 97 % | HEIGHT: 64 IN | HEART RATE: 65 BPM | WEIGHT: 115 LBS | BODY MASS INDEX: 19.63 KG/M2 | RESPIRATION RATE: 18 BRPM

## 2024-04-29 DIAGNOSIS — E78.5 HYPERLIPIDEMIA, UNSPECIFIED: ICD-10-CM

## 2024-04-29 DIAGNOSIS — I10 ESSENTIAL (PRIMARY) HYPERTENSION: ICD-10-CM

## 2024-04-29 DIAGNOSIS — I35.1 NONRHEUMATIC AORTIC (VALVE) INSUFFICIENCY: ICD-10-CM

## 2024-04-29 DIAGNOSIS — I25.10 ATHEROSCLEROTIC HEART DISEASE OF NATIVE CORONARY ARTERY W/OUT ANGINA PECTORIS: ICD-10-CM

## 2024-04-29 DIAGNOSIS — I71.9 AORTIC ANEURYSM OF UNSPECIFIED SITE, W/OUT RUPTURE: ICD-10-CM

## 2024-04-29 PROCEDURE — 93000 ELECTROCARDIOGRAM COMPLETE: CPT

## 2024-04-29 PROCEDURE — 99215 OFFICE O/P EST HI 40 MIN: CPT | Mod: 25

## 2024-04-29 PROCEDURE — 93306 TTE W/DOPPLER COMPLETE: CPT

## 2024-04-29 NOTE — PHYSICAL EXAM
[Frail] : frail [Normal S1, S2] : normal S1, S2 [No Rub] : no rub [No Gallop] : no gallop [Murmur] : murmur [Normal] : alert and oriented, normal memory [de-identified] : ll/Vl systolic

## 2024-04-29 NOTE — HISTORY OF PRESENT ILLNESS
[FreeTextEntry1] : Since her last visit with me, she unfortunately lost her  after he had a long illness. She has been grieving, feeling depressed but denies any suicidal ideation, intent or plan.  Appetite has been poor. She has periodic "jitteriness" where she feels anxious.  However, she has been sleeping okay. She has no cardiac complaints.  No chest pain or chest pressure.  No shortness of breath.  No palpitations.  No dizziness.  No syncope.  No edema, orthopnea.  No PND.

## 2024-04-29 NOTE — DISCUSSION/SUMMARY
[FreeTextEntry1] : 88-year-old woman with hypertension, hyperlipidemia, palpitations, CAD, aortic aneurysm. As noted above, she has been grieving loss of her . She has episodic feelings of "jitteriness". No cardiac symptoms. On physical examination, blood pressure stable.  She is euvolemic. EKG sinus rhythm, short [place] interval, no significant ST changes noted. Echocardiogram done today in the office.  Normal left ventricular systolic function.  Mild aortic root enlargement.  Mild to moderate aortic regurgitation. Current cardiac status appears to be at his baseline.  Plan 1.  Current medications reviewed, no changes. 2.  Advised her to try to increase her caloric intake. 3.  She will continue follow-up with primary care provider for overall medical care. 4.  Follow-up with me in the office in 6 months. 5.  The above was reviewed with her, all of her questions have been answered to her satisfaction.  [EKG obtained to assist in diagnosis and management of assessed problem(s)] : EKG obtained to assist in diagnosis and management of assessed problem(s)

## 2024-04-29 NOTE — CARDIOLOGY SUMMARY
[___] : [unfilled] [de-identified] : April 29 2024. Sinus Rhythm -Short OK syndrome  María = 118 BORDERLINE RHYTHM [de-identified] : March 15 2022. Normal LV function. Mild Ao root enlargement. Concentric left ventricular remodeling

## 2024-04-29 NOTE — REASON FOR VISIT
[FreeTextEntry1] : Cardiology follow-up visit for evaluation management of hypertension, hyperlipidemia, palpitations, CAD, aortic aneurysm.

## 2024-05-21 ENCOUNTER — APPOINTMENT (OUTPATIENT)
Dept: MAMMOGRAPHY | Facility: HOSPITAL | Age: 89
End: 2024-05-21
Payer: MEDICARE

## 2024-05-21 ENCOUNTER — APPOINTMENT (OUTPATIENT)
Dept: RADIOLOGY | Facility: HOSPITAL | Age: 89
End: 2024-05-21

## 2024-05-21 ENCOUNTER — APPOINTMENT (OUTPATIENT)
Dept: ULTRASOUND IMAGING | Facility: HOSPITAL | Age: 89
End: 2024-05-21
Payer: MEDICARE

## 2024-05-21 ENCOUNTER — OUTPATIENT (OUTPATIENT)
Dept: OUTPATIENT SERVICES | Facility: HOSPITAL | Age: 89
LOS: 1 days | End: 2024-05-21
Payer: MEDICARE

## 2024-05-21 DIAGNOSIS — Z00.8 ENCOUNTER FOR OTHER GENERAL EXAMINATION: ICD-10-CM

## 2024-05-21 DIAGNOSIS — Z90.710 ACQUIRED ABSENCE OF BOTH CERVIX AND UTERUS: Chronic | ICD-10-CM

## 2024-05-21 PROCEDURE — G0279: CPT | Mod: 26

## 2024-05-21 PROCEDURE — G0279: CPT

## 2024-05-21 PROCEDURE — 72070 X-RAY EXAM THORAC SPINE 2VWS: CPT | Mod: 26

## 2024-05-21 PROCEDURE — 77067 SCR MAMMO BI INCL CAD: CPT

## 2024-05-21 PROCEDURE — 77066 DX MAMMO INCL CAD BI: CPT | Mod: 26

## 2024-05-21 PROCEDURE — 72100 X-RAY EXAM L-S SPINE 2/3 VWS: CPT | Mod: 26

## 2024-05-21 PROCEDURE — 77063 BREAST TOMOSYNTHESIS BI: CPT

## 2024-05-21 PROCEDURE — 76641 ULTRASOUND BREAST COMPLETE: CPT | Mod: 26,50

## 2024-05-21 PROCEDURE — 76641 ULTRASOUND BREAST COMPLETE: CPT

## 2024-05-21 PROCEDURE — 72100 X-RAY EXAM L-S SPINE 2/3 VWS: CPT

## 2024-05-21 PROCEDURE — 72070 X-RAY EXAM THORAC SPINE 2VWS: CPT

## 2024-05-21 PROCEDURE — 77066 DX MAMMO INCL CAD BI: CPT

## 2024-06-11 RX ADMIN — OXYCODONE HYDROCHLORIDE 5 MILLIGRAM(S): 30 TABLET ORAL at 11:00

## 2024-06-25 ENCOUNTER — INPATIENT (INPATIENT)
Facility: HOSPITAL | Age: 89
LOS: 10 days | Discharge: HOME | DRG: 556 | End: 2024-07-06
Attending: STUDENT IN AN ORGANIZED HEALTH CARE EDUCATION/TRAINING PROGRAM | Admitting: STUDENT IN AN ORGANIZED HEALTH CARE EDUCATION/TRAINING PROGRAM
Payer: MEDICARE

## 2024-06-25 VITALS
HEIGHT: 64 IN | DIASTOLIC BLOOD PRESSURE: 115 MMHG | WEIGHT: 111.99 LBS | OXYGEN SATURATION: 98 % | SYSTOLIC BLOOD PRESSURE: 167 MMHG | TEMPERATURE: 97 F | HEART RATE: 93 BPM | RESPIRATION RATE: 18 BRPM

## 2024-06-25 DIAGNOSIS — R26.2 DIFFICULTY IN WALKING, NOT ELSEWHERE CLASSIFIED: ICD-10-CM

## 2024-06-25 DIAGNOSIS — Z90.710 ACQUIRED ABSENCE OF BOTH CERVIX AND UTERUS: Chronic | ICD-10-CM

## 2024-06-25 DIAGNOSIS — Z98.49 CATARACT EXTRACTION STATUS, UNSPECIFIED EYE: Chronic | ICD-10-CM

## 2024-06-25 LAB
ALBUMIN SERPL ELPH-MCNC: 3.7 G/DL — SIGNIFICANT CHANGE UP (ref 3.3–5)
ALP SERPL-CCNC: 114 U/L — SIGNIFICANT CHANGE UP (ref 40–120)
ALT FLD-CCNC: 32 U/L — SIGNIFICANT CHANGE UP (ref 10–45)
ANION GAP SERPL CALC-SCNC: 7 MMOL/L — SIGNIFICANT CHANGE UP (ref 5–17)
AST SERPL-CCNC: 28 U/L — SIGNIFICANT CHANGE UP (ref 10–40)
BASOPHILS # BLD AUTO: 0.11 K/UL — SIGNIFICANT CHANGE UP (ref 0–0.2)
BASOPHILS NFR BLD AUTO: 1.1 % — SIGNIFICANT CHANGE UP (ref 0–2)
BILIRUB SERPL-MCNC: 0.4 MG/DL — SIGNIFICANT CHANGE UP (ref 0.2–1.2)
BUN SERPL-MCNC: 12 MG/DL — SIGNIFICANT CHANGE UP (ref 7–23)
CALCIUM SERPL-MCNC: 9.6 MG/DL — SIGNIFICANT CHANGE UP (ref 8.4–10.5)
CHLORIDE SERPL-SCNC: 101 MMOL/L — SIGNIFICANT CHANGE UP (ref 96–108)
CO2 SERPL-SCNC: 30 MMOL/L — SIGNIFICANT CHANGE UP (ref 22–31)
CREAT SERPL-MCNC: 0.79 MG/DL — SIGNIFICANT CHANGE UP (ref 0.5–1.3)
EGFR: 72 ML/MIN/1.73M2 — SIGNIFICANT CHANGE UP
EGFR: 72 ML/MIN/1.73M2 — SIGNIFICANT CHANGE UP
EOSINOPHIL # BLD AUTO: 0.11 K/UL — SIGNIFICANT CHANGE UP (ref 0–0.5)
EOSINOPHIL NFR BLD AUTO: 1.1 % — SIGNIFICANT CHANGE UP (ref 0–6)
GLUCOSE SERPL-MCNC: 133 MG/DL — HIGH (ref 70–99)
HCT VFR BLD CALC: 41 % — SIGNIFICANT CHANGE UP (ref 34.5–45)
HGB BLD-MCNC: 13.9 G/DL — SIGNIFICANT CHANGE UP (ref 11.5–15.5)
IMM GRANULOCYTES NFR BLD AUTO: 0.7 % — SIGNIFICANT CHANGE UP (ref 0–0.9)
LYMPHOCYTES # BLD AUTO: 1.47 K/UL — SIGNIFICANT CHANGE UP (ref 1–3.3)
LYMPHOCYTES # BLD AUTO: 15.1 % — SIGNIFICANT CHANGE UP (ref 13–44)
MCHC RBC-ENTMCNC: 31.4 PG — SIGNIFICANT CHANGE UP (ref 27–34)
MCHC RBC-ENTMCNC: 33.9 GM/DL — SIGNIFICANT CHANGE UP (ref 32–36)
MCV RBC AUTO: 92.6 FL — SIGNIFICANT CHANGE UP (ref 80–100)
MONOCYTES # BLD AUTO: 0.72 K/UL — SIGNIFICANT CHANGE UP (ref 0–0.9)
MONOCYTES NFR BLD AUTO: 7.4 % — SIGNIFICANT CHANGE UP (ref 2–14)
NEUTROPHILS # BLD AUTO: 7.25 K/UL — SIGNIFICANT CHANGE UP (ref 1.8–7.4)
NEUTROPHILS NFR BLD AUTO: 74.6 % — SIGNIFICANT CHANGE UP (ref 43–77)
NRBC # BLD: 0 /100 WBCS — SIGNIFICANT CHANGE UP (ref 0–0)
NRBC BLD-RTO: 0 /100 WBCS — SIGNIFICANT CHANGE UP (ref 0–0)
PLATELET # BLD AUTO: 327 K/UL — SIGNIFICANT CHANGE UP (ref 150–400)
POTASSIUM SERPL-MCNC: 4 MMOL/L — SIGNIFICANT CHANGE UP (ref 3.5–5.3)
POTASSIUM SERPL-SCNC: 4 MMOL/L — SIGNIFICANT CHANGE UP (ref 3.5–5.3)
PROT SERPL-MCNC: 7.4 G/DL — SIGNIFICANT CHANGE UP (ref 6–8.3)
RBC # BLD: 4.43 M/UL — SIGNIFICANT CHANGE UP (ref 3.8–5.2)
RBC # FLD: 13.2 % — SIGNIFICANT CHANGE UP (ref 10.3–14.5)
SODIUM SERPL-SCNC: 138 MMOL/L — SIGNIFICANT CHANGE UP (ref 135–145)
WBC # BLD: 9.73 K/UL — SIGNIFICANT CHANGE UP (ref 3.8–10.5)
WBC # FLD AUTO: 9.73 K/UL — SIGNIFICANT CHANGE UP (ref 3.8–10.5)

## 2024-06-25 PROCEDURE — 70486 CT MAXILLOFACIAL W/O DYE: CPT | Mod: 26,MC

## 2024-06-25 PROCEDURE — 70450 CT HEAD/BRAIN W/O DYE: CPT | Mod: 26,MC

## 2024-06-25 PROCEDURE — 99223 1ST HOSP IP/OBS HIGH 75: CPT | Mod: GC

## 2024-06-25 PROCEDURE — 71250 CT THORAX DX C-: CPT | Mod: 26,MC

## 2024-06-25 PROCEDURE — G0316 PROLONG INPT EVAL ADD15 M: CPT

## 2024-06-25 PROCEDURE — 73200 CT UPPER EXTREMITY W/O DYE: CPT | Mod: 26,RT

## 2024-06-25 PROCEDURE — 73090 X-RAY EXAM OF FOREARM: CPT | Mod: 26,LT

## 2024-06-25 PROCEDURE — 93010 ELECTROCARDIOGRAM REPORT: CPT

## 2024-06-25 PROCEDURE — 99285 EMERGENCY DEPT VISIT HI MDM: CPT

## 2024-06-25 PROCEDURE — 73070 X-RAY EXAM OF ELBOW: CPT | Mod: 26,RT

## 2024-06-25 RX ORDER — METOPROLOL SUCCINATE 50 MG/1
25 TABLET, EXTENDED RELEASE ORAL THREE TIMES A DAY
Refills: 0 | Status: DISCONTINUED | OUTPATIENT
Start: 2024-06-25 | End: 2024-07-06

## 2024-06-25 RX ORDER — MAGNESIUM, ALUMINUM HYDROXIDE 200-200 MG
30 TABLET,CHEWABLE ORAL EVERY 4 HOURS
Refills: 0 | Status: DISCONTINUED | OUTPATIENT
Start: 2024-06-25 | End: 2024-07-06

## 2024-06-25 RX ORDER — ACETAMINOPHEN 500 MG/5ML
650 LIQUID (ML) ORAL ONCE
Refills: 0 | Status: COMPLETED | OUTPATIENT
Start: 2024-06-25 | End: 2024-06-25

## 2024-06-25 RX ORDER — ONDANSETRON HCL/PF 4 MG/2 ML
4 VIAL (ML) INJECTION EVERY 8 HOURS
Refills: 0 | Status: DISCONTINUED | OUTPATIENT
Start: 2024-06-25 | End: 2024-07-06

## 2024-06-25 RX ORDER — ACETAMINOPHEN 500 MG/5ML
650 LIQUID (ML) ORAL EVERY 6 HOURS
Refills: 0 | Status: DISCONTINUED | OUTPATIENT
Start: 2024-06-25 | End: 2024-06-26

## 2024-06-25 RX ORDER — MELATONIN 5 MG
3 TABLET ORAL AT BEDTIME
Refills: 0 | Status: DISCONTINUED | OUTPATIENT
Start: 2024-06-25 | End: 2024-07-06

## 2024-06-25 RX ORDER — LIDOCAINE HCL/PF 10 MG/ML
10 VIAL (ML) INJECTION ONCE
Refills: 0 | Status: COMPLETED | OUTPATIENT
Start: 2024-06-25 | End: 2024-06-25

## 2024-06-25 RX ADMIN — Medication 10 MILLILITER(S): at 18:05

## 2024-06-25 RX ADMIN — METOPROLOL SUCCINATE 25 MILLIGRAM(S): 50 TABLET, EXTENDED RELEASE ORAL at 22:39

## 2024-06-25 RX ADMIN — Medication 650 MILLIGRAM(S): at 18:34

## 2024-06-25 RX ADMIN — Medication 650 MILLIGRAM(S): at 18:04

## 2024-06-26 DIAGNOSIS — S42.309A UNSPECIFIED FRACTURE OF SHAFT OF HUMERUS, UNSPECIFIED ARM, INITIAL ENCOUNTER FOR CLOSED FRACTURE: ICD-10-CM

## 2024-06-26 LAB
ALBUMIN SERPL ELPH-MCNC: 3.5 G/DL — SIGNIFICANT CHANGE UP (ref 3.3–5)
ALP SERPL-CCNC: 107 U/L — SIGNIFICANT CHANGE UP (ref 40–120)
ALT FLD-CCNC: 28 U/L — SIGNIFICANT CHANGE UP (ref 10–45)
ANION GAP SERPL CALC-SCNC: 8 MMOL/L — SIGNIFICANT CHANGE UP (ref 5–17)
APTT BLD: 33 SEC — SIGNIFICANT CHANGE UP (ref 24.5–35.6)
AST SERPL-CCNC: 27 U/L — SIGNIFICANT CHANGE UP (ref 10–40)
BASOPHILS # BLD AUTO: 0.11 K/UL — SIGNIFICANT CHANGE UP (ref 0–0.2)
BASOPHILS NFR BLD AUTO: 1.3 % — SIGNIFICANT CHANGE UP (ref 0–2)
BILIRUB SERPL-MCNC: 1 MG/DL — SIGNIFICANT CHANGE UP (ref 0.2–1.2)
BUN SERPL-MCNC: 13 MG/DL — SIGNIFICANT CHANGE UP (ref 7–23)
CALCIUM SERPL-MCNC: 9.1 MG/DL — SIGNIFICANT CHANGE UP (ref 8.4–10.5)
CHLORIDE SERPL-SCNC: 103 MMOL/L — SIGNIFICANT CHANGE UP (ref 96–108)
CO2 SERPL-SCNC: 28 MMOL/L — SIGNIFICANT CHANGE UP (ref 22–31)
CREAT SERPL-MCNC: 0.62 MG/DL — SIGNIFICANT CHANGE UP (ref 0.5–1.3)
EGFR: 86 ML/MIN/1.73M2 — SIGNIFICANT CHANGE UP
EGFR: 86 ML/MIN/1.73M2 — SIGNIFICANT CHANGE UP
EOSINOPHIL # BLD AUTO: 0.1 K/UL — SIGNIFICANT CHANGE UP (ref 0–0.5)
EOSINOPHIL NFR BLD AUTO: 1.2 % — SIGNIFICANT CHANGE UP (ref 0–6)
GLUCOSE SERPL-MCNC: 106 MG/DL — HIGH (ref 70–99)
HCT VFR BLD CALC: 38.8 % — SIGNIFICANT CHANGE UP (ref 34.5–45)
HGB BLD-MCNC: 13.1 G/DL — SIGNIFICANT CHANGE UP (ref 11.5–15.5)
IMM GRANULOCYTES NFR BLD AUTO: 0.5 % — SIGNIFICANT CHANGE UP (ref 0–0.9)
INR BLD: 1.05 RATIO — SIGNIFICANT CHANGE UP (ref 0.85–1.18)
LYMPHOCYTES # BLD AUTO: 1.66 K/UL — SIGNIFICANT CHANGE UP (ref 1–3.3)
LYMPHOCYTES # BLD AUTO: 19.1 % — SIGNIFICANT CHANGE UP (ref 13–44)
MCHC RBC-ENTMCNC: 31.6 PG — SIGNIFICANT CHANGE UP (ref 27–34)
MCHC RBC-ENTMCNC: 33.8 GM/DL — SIGNIFICANT CHANGE UP (ref 32–36)
MCV RBC AUTO: 93.5 FL — SIGNIFICANT CHANGE UP (ref 80–100)
MONOCYTES # BLD AUTO: 0.91 K/UL — HIGH (ref 0–0.9)
MONOCYTES NFR BLD AUTO: 10.5 % — SIGNIFICANT CHANGE UP (ref 2–14)
NEUTROPHILS # BLD AUTO: 5.87 K/UL — SIGNIFICANT CHANGE UP (ref 1.8–7.4)
NEUTROPHILS NFR BLD AUTO: 67.4 % — SIGNIFICANT CHANGE UP (ref 43–77)
NRBC # BLD: 0 /100 WBCS — SIGNIFICANT CHANGE UP (ref 0–0)
NRBC BLD-RTO: 0 /100 WBCS — SIGNIFICANT CHANGE UP (ref 0–0)
PLATELET # BLD AUTO: 295 K/UL — SIGNIFICANT CHANGE UP (ref 150–400)
POTASSIUM SERPL-MCNC: 3.8 MMOL/L — SIGNIFICANT CHANGE UP (ref 3.5–5.3)
POTASSIUM SERPL-SCNC: 3.8 MMOL/L — SIGNIFICANT CHANGE UP (ref 3.5–5.3)
PROT SERPL-MCNC: 6.9 G/DL — SIGNIFICANT CHANGE UP (ref 6–8.3)
PROTHROM AB SERPL-ACNC: 12.3 SEC — SIGNIFICANT CHANGE UP (ref 9.5–13)
RBC # BLD: 4.15 M/UL — SIGNIFICANT CHANGE UP (ref 3.8–5.2)
RBC # FLD: 13.2 % — SIGNIFICANT CHANGE UP (ref 10.3–14.5)
SODIUM SERPL-SCNC: 139 MMOL/L — SIGNIFICANT CHANGE UP (ref 135–145)
WBC # BLD: 8.69 K/UL — SIGNIFICANT CHANGE UP (ref 3.8–10.5)
WBC # FLD AUTO: 8.69 K/UL — SIGNIFICANT CHANGE UP (ref 3.8–10.5)

## 2024-06-26 PROCEDURE — 71045 X-RAY EXAM CHEST 1 VIEW: CPT | Mod: 26

## 2024-06-26 PROCEDURE — 99233 SBSQ HOSP IP/OBS HIGH 50: CPT

## 2024-06-26 RX ORDER — SENNA 187 MG
1 TABLET ORAL DAILY
Refills: 0 | Status: DISCONTINUED | OUTPATIENT
Start: 2024-06-26 | End: 2024-07-06

## 2024-06-26 RX ORDER — ACETAMINOPHEN 500 MG/5ML
650 LIQUID (ML) ORAL EVERY 6 HOURS
Refills: 0 | Status: DISCONTINUED | OUTPATIENT
Start: 2024-06-26 | End: 2024-07-06

## 2024-06-26 RX ORDER — OXYCODONE HYDROCHLORIDE 30 MG/1
5 TABLET ORAL EVERY 6 HOURS
Refills: 0 | Status: DISCONTINUED | OUTPATIENT
Start: 2024-06-26 | End: 2024-07-01

## 2024-06-26 RX ORDER — POLYETHYLENE GLYCOL 3350 17 G/17G
17 POWDER, FOR SOLUTION ORAL DAILY
Refills: 0 | Status: DISCONTINUED | OUTPATIENT
Start: 2024-06-26 | End: 2024-07-02

## 2024-06-26 RX ORDER — TRAMADOL HYDROCHLORIDE 50 MG/1
25 TABLET, FILM COATED ORAL EVERY 6 HOURS
Refills: 0 | Status: DISCONTINUED | OUTPATIENT
Start: 2024-06-26 | End: 2024-07-03

## 2024-06-26 RX ADMIN — Medication 650 MILLIGRAM(S): at 13:30

## 2024-06-26 RX ADMIN — METOPROLOL SUCCINATE 25 MILLIGRAM(S): 50 TABLET, EXTENDED RELEASE ORAL at 15:57

## 2024-06-26 RX ADMIN — METOPROLOL SUCCINATE 25 MILLIGRAM(S): 50 TABLET, EXTENDED RELEASE ORAL at 06:02

## 2024-06-26 RX ADMIN — Medication 650 MILLIGRAM(S): at 19:15

## 2024-06-26 RX ADMIN — Medication 650 MILLIGRAM(S): at 12:30

## 2024-06-26 RX ADMIN — METOPROLOL SUCCINATE 25 MILLIGRAM(S): 50 TABLET, EXTENDED RELEASE ORAL at 21:22

## 2024-06-26 RX ADMIN — Medication 650 MILLIGRAM(S): at 18:19

## 2024-06-26 RX ADMIN — Medication 3 MILLIGRAM(S): at 21:21

## 2024-06-27 ENCOUNTER — TRANSCRIPTION ENCOUNTER (OUTPATIENT)
Age: 89
End: 2024-06-27

## 2024-06-27 LAB
ANION GAP SERPL CALC-SCNC: 7 MMOL/L — SIGNIFICANT CHANGE UP (ref 5–17)
BUN SERPL-MCNC: 22 MG/DL — SIGNIFICANT CHANGE UP (ref 7–23)
CALCIUM SERPL-MCNC: 9.4 MG/DL — SIGNIFICANT CHANGE UP (ref 8.4–10.5)
CHLORIDE SERPL-SCNC: 102 MMOL/L — SIGNIFICANT CHANGE UP (ref 96–108)
CO2 SERPL-SCNC: 27 MMOL/L — SIGNIFICANT CHANGE UP (ref 22–31)
CREAT SERPL-MCNC: 0.78 MG/DL — SIGNIFICANT CHANGE UP (ref 0.5–1.3)
EGFR: 74 ML/MIN/1.73M2 — SIGNIFICANT CHANGE UP
EGFR: 74 ML/MIN/1.73M2 — SIGNIFICANT CHANGE UP
GLUCOSE SERPL-MCNC: 93 MG/DL — SIGNIFICANT CHANGE UP (ref 70–99)
HCT VFR BLD CALC: 39.5 % — SIGNIFICANT CHANGE UP (ref 34.5–45)
HGB BLD-MCNC: 13.4 G/DL — SIGNIFICANT CHANGE UP (ref 11.5–15.5)
MCHC RBC-ENTMCNC: 31.8 PG — SIGNIFICANT CHANGE UP (ref 27–34)
MCHC RBC-ENTMCNC: 33.9 GM/DL — SIGNIFICANT CHANGE UP (ref 32–36)
MCV RBC AUTO: 93.6 FL — SIGNIFICANT CHANGE UP (ref 80–100)
NRBC # BLD: 0 /100 WBCS — SIGNIFICANT CHANGE UP (ref 0–0)
NRBC BLD-RTO: 0 /100 WBCS — SIGNIFICANT CHANGE UP (ref 0–0)
PLATELET # BLD AUTO: 257 K/UL — SIGNIFICANT CHANGE UP (ref 150–400)
POTASSIUM SERPL-MCNC: 4.2 MMOL/L — SIGNIFICANT CHANGE UP (ref 3.5–5.3)
POTASSIUM SERPL-SCNC: 4.2 MMOL/L — SIGNIFICANT CHANGE UP (ref 3.5–5.3)
RBC # BLD: 4.22 M/UL — SIGNIFICANT CHANGE UP (ref 3.8–5.2)
RBC # FLD: 13.3 % — SIGNIFICANT CHANGE UP (ref 10.3–14.5)
SODIUM SERPL-SCNC: 136 MMOL/L — SIGNIFICANT CHANGE UP (ref 135–145)
WBC # BLD: 8.86 K/UL — SIGNIFICANT CHANGE UP (ref 3.8–10.5)
WBC # FLD AUTO: 8.86 K/UL — SIGNIFICANT CHANGE UP (ref 3.8–10.5)

## 2024-06-27 PROCEDURE — 99222 1ST HOSP IP/OBS MODERATE 55: CPT | Mod: GC

## 2024-06-27 PROCEDURE — 99232 SBSQ HOSP IP/OBS MODERATE 35: CPT

## 2024-06-27 RX ORDER — BUSPIRONE HYDROCHLORIDE 10 MG/1
0 TABLET ORAL
Qty: 0 | Refills: 3 | DISCHARGE

## 2024-06-27 RX ORDER — SODIUM CHLORIDE 0.65 %
1 AEROSOL, SPRAY (ML) NASAL EVERY 6 HOURS
Refills: 0 | Status: DISCONTINUED | OUTPATIENT
Start: 2024-06-27 | End: 2024-07-06

## 2024-06-27 RX ORDER — SERTRALINE 100 MG/1
0 TABLET, FILM COATED ORAL
Qty: 0 | Refills: 0 | DISCHARGE

## 2024-06-27 RX ORDER — POLYETHYLENE GLYCOL 3350 17 G/17G
17 POWDER, FOR SOLUTION ORAL
Qty: 0 | Refills: 0 | DISCHARGE
Start: 2024-06-27

## 2024-06-27 RX ORDER — SENNA 187 MG
1 TABLET ORAL
Qty: 0 | Refills: 0 | DISCHARGE
Start: 2024-06-27

## 2024-06-27 RX ORDER — ACETAMINOPHEN 500 MG/5ML
2 LIQUID (ML) ORAL
Qty: 0 | Refills: 0 | DISCHARGE
Start: 2024-06-27

## 2024-06-27 RX ORDER — LIDOCAINE HYDROCHLORIDE 20 MG/ML
1 JELLY TOPICAL DAILY
Refills: 0 | Status: DISCONTINUED | OUTPATIENT
Start: 2024-06-27 | End: 2024-07-06

## 2024-06-27 RX ORDER — OXYCODONE HYDROCHLORIDE 30 MG/1
1 TABLET ORAL
Qty: 0 | Refills: 0 | DISCHARGE
Start: 2024-06-27

## 2024-06-27 RX ORDER — TRAMADOL HYDROCHLORIDE 50 MG/1
0.5 TABLET, FILM COATED ORAL
Qty: 0 | Refills: 0 | DISCHARGE
Start: 2024-06-27

## 2024-06-27 RX ADMIN — Medication 650 MILLIGRAM(S): at 12:15

## 2024-06-27 RX ADMIN — Medication 650 MILLIGRAM(S): at 11:34

## 2024-06-27 RX ADMIN — Medication 1 TABLET(S): at 11:34

## 2024-06-27 RX ADMIN — TRAMADOL HYDROCHLORIDE 25 MILLIGRAM(S): 50 TABLET, FILM COATED ORAL at 22:36

## 2024-06-27 RX ADMIN — Medication 650 MILLIGRAM(S): at 01:23

## 2024-06-27 RX ADMIN — Medication 650 MILLIGRAM(S): at 00:23

## 2024-06-27 RX ADMIN — Medication 650 MILLIGRAM(S): at 05:39

## 2024-06-27 RX ADMIN — METOPROLOL SUCCINATE 25 MILLIGRAM(S): 50 TABLET, EXTENDED RELEASE ORAL at 05:39

## 2024-06-27 RX ADMIN — Medication 650 MILLIGRAM(S): at 20:34

## 2024-06-27 RX ADMIN — LIDOCAINE HYDROCHLORIDE 1 PATCH: 20 JELLY TOPICAL at 19:40

## 2024-06-27 RX ADMIN — TRAMADOL HYDROCHLORIDE 25 MILLIGRAM(S): 50 TABLET, FILM COATED ORAL at 22:06

## 2024-06-27 RX ADMIN — Medication 3 MILLIGRAM(S): at 21:18

## 2024-06-27 RX ADMIN — LIDOCAINE HYDROCHLORIDE 1 PATCH: 20 JELLY TOPICAL at 11:39

## 2024-06-27 RX ADMIN — METOPROLOL SUCCINATE 25 MILLIGRAM(S): 50 TABLET, EXTENDED RELEASE ORAL at 21:10

## 2024-06-27 RX ADMIN — Medication 650 MILLIGRAM(S): at 20:04

## 2024-06-27 RX ADMIN — METOPROLOL SUCCINATE 25 MILLIGRAM(S): 50 TABLET, EXTENDED RELEASE ORAL at 14:10

## 2024-06-28 PROCEDURE — 99232 SBSQ HOSP IP/OBS MODERATE 35: CPT

## 2024-06-28 RX ORDER — CYCLOBENZAPRINE HYDROCHLORIDE 15 MG/1
5 CAPSULE, EXTENDED RELEASE ORAL THREE TIMES A DAY
Refills: 0 | Status: DISCONTINUED | OUTPATIENT
Start: 2024-06-28 | End: 2024-07-06

## 2024-06-28 RX ORDER — CYCLOBENZAPRINE HYDROCHLORIDE 15 MG/1
1 CAPSULE, EXTENDED RELEASE ORAL
Qty: 0 | Refills: 0 | DISCHARGE
Start: 2024-06-28

## 2024-06-28 RX ORDER — CYCLOBENZAPRINE HYDROCHLORIDE 15 MG/1
5 CAPSULE, EXTENDED RELEASE ORAL ONCE
Refills: 0 | Status: COMPLETED | OUTPATIENT
Start: 2024-06-28 | End: 2024-06-28

## 2024-06-28 RX ORDER — LIDOCAINE HYDROCHLORIDE 20 MG/ML
1 JELLY TOPICAL
Qty: 0 | Refills: 0 | DISCHARGE
Start: 2024-06-28

## 2024-06-28 RX ORDER — SODIUM CHLORIDE 0.65 %
1 AEROSOL, SPRAY (ML) NASAL
Qty: 0 | Refills: 0 | DISCHARGE
Start: 2024-06-28

## 2024-06-28 RX ORDER — LIDOCAINE HYDROCHLORIDE 20 MG/ML
1 JELLY TOPICAL DAILY
Refills: 0 | Status: DISCONTINUED | OUTPATIENT
Start: 2024-06-28 | End: 2024-06-28

## 2024-06-28 RX ORDER — LIDOCAINE HYDROCHLORIDE 20 MG/ML
1 JELLY TOPICAL DAILY
Refills: 0 | Status: DISCONTINUED | OUTPATIENT
Start: 2024-06-28 | End: 2024-07-06

## 2024-06-28 RX ADMIN — LIDOCAINE HYDROCHLORIDE 1 PATCH: 20 JELLY TOPICAL at 18:16

## 2024-06-28 RX ADMIN — Medication 650 MILLIGRAM(S): at 22:00

## 2024-06-28 RX ADMIN — TRAMADOL HYDROCHLORIDE 25 MILLIGRAM(S): 50 TABLET, FILM COATED ORAL at 22:40

## 2024-06-28 RX ADMIN — LIDOCAINE HYDROCHLORIDE 1 PATCH: 20 JELLY TOPICAL at 00:10

## 2024-06-28 RX ADMIN — METOPROLOL SUCCINATE 25 MILLIGRAM(S): 50 TABLET, EXTENDED RELEASE ORAL at 05:06

## 2024-06-28 RX ADMIN — Medication 650 MILLIGRAM(S): at 21:16

## 2024-06-28 RX ADMIN — TRAMADOL HYDROCHLORIDE 25 MILLIGRAM(S): 50 TABLET, FILM COATED ORAL at 07:50

## 2024-06-28 RX ADMIN — POLYETHYLENE GLYCOL 3350 17 GRAM(S): 17 POWDER, FOR SOLUTION ORAL at 13:02

## 2024-06-28 RX ADMIN — LIDOCAINE HYDROCHLORIDE 1 PATCH: 20 JELLY TOPICAL at 13:02

## 2024-06-28 RX ADMIN — TRAMADOL HYDROCHLORIDE 25 MILLIGRAM(S): 50 TABLET, FILM COATED ORAL at 21:37

## 2024-06-28 RX ADMIN — CYCLOBENZAPRINE HYDROCHLORIDE 5 MILLIGRAM(S): 15 CAPSULE, EXTENDED RELEASE ORAL at 21:17

## 2024-06-28 RX ADMIN — LIDOCAINE HYDROCHLORIDE 1 PATCH: 20 JELLY TOPICAL at 18:15

## 2024-06-28 RX ADMIN — LIDOCAINE HYDROCHLORIDE 1 PATCH: 20 JELLY TOPICAL at 05:38

## 2024-06-28 RX ADMIN — TRAMADOL HYDROCHLORIDE 25 MILLIGRAM(S): 50 TABLET, FILM COATED ORAL at 12:59

## 2024-06-28 RX ADMIN — Medication 3 MILLIGRAM(S): at 21:17

## 2024-06-28 RX ADMIN — METOPROLOL SUCCINATE 25 MILLIGRAM(S): 50 TABLET, EXTENDED RELEASE ORAL at 13:04

## 2024-06-28 RX ADMIN — Medication 1 SPRAY(S): at 13:02

## 2024-06-28 RX ADMIN — METOPROLOL SUCCINATE 25 MILLIGRAM(S): 50 TABLET, EXTENDED RELEASE ORAL at 21:17

## 2024-06-28 RX ADMIN — LIDOCAINE HYDROCHLORIDE 1 PATCH: 20 JELLY TOPICAL at 07:44

## 2024-06-28 RX ADMIN — Medication 1 TABLET(S): at 15:50

## 2024-06-29 PROCEDURE — 99232 SBSQ HOSP IP/OBS MODERATE 35: CPT

## 2024-06-29 RX ADMIN — Medication 650 MILLIGRAM(S): at 19:50

## 2024-06-29 RX ADMIN — OXYCODONE HYDROCHLORIDE 5 MILLIGRAM(S): 30 TABLET ORAL at 10:15

## 2024-06-29 RX ADMIN — POLYETHYLENE GLYCOL 3350 17 GRAM(S): 17 POWDER, FOR SOLUTION ORAL at 13:59

## 2024-06-29 RX ADMIN — LIDOCAINE HYDROCHLORIDE 1 PATCH: 20 JELLY TOPICAL at 02:06

## 2024-06-29 RX ADMIN — METOPROLOL SUCCINATE 25 MILLIGRAM(S): 50 TABLET, EXTENDED RELEASE ORAL at 05:02

## 2024-06-29 RX ADMIN — Medication 650 MILLIGRAM(S): at 20:00

## 2024-06-29 RX ADMIN — LIDOCAINE HYDROCHLORIDE 1 PATCH: 20 JELLY TOPICAL at 13:58

## 2024-06-29 RX ADMIN — METOPROLOL SUCCINATE 25 MILLIGRAM(S): 50 TABLET, EXTENDED RELEASE ORAL at 21:01

## 2024-06-29 RX ADMIN — LIDOCAINE HYDROCHLORIDE 1 PATCH: 20 JELLY TOPICAL at 21:00

## 2024-06-29 RX ADMIN — METOPROLOL SUCCINATE 25 MILLIGRAM(S): 50 TABLET, EXTENDED RELEASE ORAL at 14:34

## 2024-06-30 LAB
ANION GAP SERPL CALC-SCNC: 7 MMOL/L — SIGNIFICANT CHANGE UP (ref 5–17)
BUN SERPL-MCNC: 18 MG/DL — SIGNIFICANT CHANGE UP (ref 7–23)
CALCIUM SERPL-MCNC: 8.8 MG/DL — SIGNIFICANT CHANGE UP (ref 8.4–10.5)
CHLORIDE SERPL-SCNC: 104 MMOL/L — SIGNIFICANT CHANGE UP (ref 96–108)
CO2 SERPL-SCNC: 28 MMOL/L — SIGNIFICANT CHANGE UP (ref 22–31)
CREAT SERPL-MCNC: 0.7 MG/DL — SIGNIFICANT CHANGE UP (ref 0.5–1.3)
EGFR: 84 ML/MIN/1.73M2 — SIGNIFICANT CHANGE UP
EGFR: 84 ML/MIN/1.73M2 — SIGNIFICANT CHANGE UP
GLUCOSE SERPL-MCNC: 104 MG/DL — HIGH (ref 70–99)
HCT VFR BLD CALC: 38.8 % — SIGNIFICANT CHANGE UP (ref 34.5–45)
HGB BLD-MCNC: 12.9 G/DL — SIGNIFICANT CHANGE UP (ref 11.5–15.5)
MCHC RBC-ENTMCNC: 31 PG — SIGNIFICANT CHANGE UP (ref 27–34)
MCHC RBC-ENTMCNC: 33.2 GM/DL — SIGNIFICANT CHANGE UP (ref 32–36)
MCV RBC AUTO: 93.3 FL — SIGNIFICANT CHANGE UP (ref 80–100)
NRBC # BLD: 0 /100 WBCS — SIGNIFICANT CHANGE UP (ref 0–0)
NRBC BLD-RTO: 0 /100 WBCS — SIGNIFICANT CHANGE UP (ref 0–0)
PLATELET # BLD AUTO: 285 K/UL — SIGNIFICANT CHANGE UP (ref 150–400)
POTASSIUM SERPL-MCNC: 4 MMOL/L — SIGNIFICANT CHANGE UP (ref 3.5–5.3)
POTASSIUM SERPL-SCNC: 4 MMOL/L — SIGNIFICANT CHANGE UP (ref 3.5–5.3)
RBC # BLD: 4.16 M/UL — SIGNIFICANT CHANGE UP (ref 3.8–5.2)
RBC # FLD: 13.4 % — SIGNIFICANT CHANGE UP (ref 10.3–14.5)
SODIUM SERPL-SCNC: 139 MMOL/L — SIGNIFICANT CHANGE UP (ref 135–145)
WBC # BLD: 8.16 K/UL — SIGNIFICANT CHANGE UP (ref 3.8–10.5)
WBC # FLD AUTO: 8.16 K/UL — SIGNIFICANT CHANGE UP (ref 3.8–10.5)

## 2024-06-30 PROCEDURE — 99232 SBSQ HOSP IP/OBS MODERATE 35: CPT

## 2024-06-30 RX ORDER — B1/B2/B3/B5/B6/B12/VIT C/FOLIC 500-0.5 MG
1 TABLET ORAL DAILY
Refills: 0 | Status: DISCONTINUED | OUTPATIENT
Start: 2024-06-30 | End: 2024-07-06

## 2024-06-30 RX ORDER — ENOXAPARIN SODIUM 100 MG/ML
40 INJECTION SUBCUTANEOUS EVERY 24 HOURS
Refills: 0 | Status: DISCONTINUED | OUTPATIENT
Start: 2024-06-30 | End: 2024-07-06

## 2024-06-30 RX ADMIN — LIDOCAINE HYDROCHLORIDE 1 PATCH: 20 JELLY TOPICAL at 18:10

## 2024-06-30 RX ADMIN — Medication 650 MILLIGRAM(S): at 08:37

## 2024-06-30 RX ADMIN — OXYCODONE HYDROCHLORIDE 5 MILLIGRAM(S): 30 TABLET ORAL at 20:21

## 2024-06-30 RX ADMIN — OXYCODONE HYDROCHLORIDE 5 MILLIGRAM(S): 30 TABLET ORAL at 20:51

## 2024-06-30 RX ADMIN — LIDOCAINE HYDROCHLORIDE 1 PATCH: 20 JELLY TOPICAL at 07:00

## 2024-06-30 RX ADMIN — Medication 1 TABLET(S): at 11:38

## 2024-06-30 RX ADMIN — LIDOCAINE HYDROCHLORIDE 1 PATCH: 20 JELLY TOPICAL at 15:41

## 2024-06-30 RX ADMIN — METOPROLOL SUCCINATE 25 MILLIGRAM(S): 50 TABLET, EXTENDED RELEASE ORAL at 21:48

## 2024-06-30 RX ADMIN — POLYETHYLENE GLYCOL 3350 17 GRAM(S): 17 POWDER, FOR SOLUTION ORAL at 11:37

## 2024-06-30 RX ADMIN — LIDOCAINE HYDROCHLORIDE 1 PATCH: 20 JELLY TOPICAL at 03:42

## 2024-06-30 RX ADMIN — Medication 650 MILLIGRAM(S): at 08:07

## 2024-06-30 RX ADMIN — LIDOCAINE HYDROCHLORIDE 1 PATCH: 20 JELLY TOPICAL at 20:12

## 2024-06-30 RX ADMIN — ENOXAPARIN SODIUM 40 MILLIGRAM(S): 100 INJECTION SUBCUTANEOUS at 11:37

## 2024-06-30 RX ADMIN — METOPROLOL SUCCINATE 25 MILLIGRAM(S): 50 TABLET, EXTENDED RELEASE ORAL at 05:23

## 2024-06-30 RX ADMIN — Medication 650 MILLIGRAM(S): at 20:21

## 2024-06-30 RX ADMIN — LIDOCAINE HYDROCHLORIDE 1 PATCH: 20 JELLY TOPICAL at 06:10

## 2024-06-30 RX ADMIN — OXYCODONE HYDROCHLORIDE 5 MILLIGRAM(S): 30 TABLET ORAL at 05:40

## 2024-06-30 RX ADMIN — METOPROLOL SUCCINATE 25 MILLIGRAM(S): 50 TABLET, EXTENDED RELEASE ORAL at 15:42

## 2024-07-01 PROCEDURE — 99232 SBSQ HOSP IP/OBS MODERATE 35: CPT

## 2024-07-01 RX ORDER — BISACODYL 5 MG
10 TABLET, DELAYED RELEASE (ENTERIC COATED) ORAL ONCE
Refills: 0 | Status: COMPLETED | OUTPATIENT
Start: 2024-07-01 | End: 2024-07-01

## 2024-07-01 RX ORDER — LACTULOSE 10 G/15ML
20 SOLUTION ORAL ONCE
Refills: 0 | Status: COMPLETED | OUTPATIENT
Start: 2024-07-01 | End: 2024-07-01

## 2024-07-01 RX ORDER — B1/B2/B3/B5/B6/B12/VIT C/FOLIC 500-0.5 MG
1 TABLET ORAL
Qty: 0 | Refills: 0 | DISCHARGE
Start: 2024-07-01

## 2024-07-01 RX ORDER — ENOXAPARIN SODIUM 100 MG/ML
40 INJECTION SUBCUTANEOUS
Qty: 0 | Refills: 0 | DISCHARGE
Start: 2024-07-01

## 2024-07-01 RX ADMIN — Medication 3 MILLIGRAM(S): at 23:13

## 2024-07-01 RX ADMIN — Medication 650 MILLIGRAM(S): at 20:04

## 2024-07-01 RX ADMIN — METOPROLOL SUCCINATE 25 MILLIGRAM(S): 50 TABLET, EXTENDED RELEASE ORAL at 21:10

## 2024-07-01 RX ADMIN — OXYCODONE HYDROCHLORIDE 5 MILLIGRAM(S): 30 TABLET ORAL at 05:43

## 2024-07-01 RX ADMIN — OXYCODONE HYDROCHLORIDE 5 MILLIGRAM(S): 30 TABLET ORAL at 21:13

## 2024-07-01 RX ADMIN — Medication 10 MILLIGRAM(S): at 16:06

## 2024-07-01 RX ADMIN — OXYCODONE HYDROCHLORIDE 5 MILLIGRAM(S): 30 TABLET ORAL at 05:13

## 2024-07-01 RX ADMIN — Medication 650 MILLIGRAM(S): at 08:36

## 2024-07-01 RX ADMIN — LIDOCAINE HYDROCHLORIDE 1 PATCH: 20 JELLY TOPICAL at 19:15

## 2024-07-01 RX ADMIN — ENOXAPARIN SODIUM 40 MILLIGRAM(S): 100 INJECTION SUBCUTANEOUS at 12:46

## 2024-07-01 RX ADMIN — OXYCODONE HYDROCHLORIDE 5 MILLIGRAM(S): 30 TABLET ORAL at 22:10

## 2024-07-01 RX ADMIN — LIDOCAINE HYDROCHLORIDE 1 PATCH: 20 JELLY TOPICAL at 12:45

## 2024-07-01 RX ADMIN — LACTULOSE 20 GRAM(S): 10 SOLUTION ORAL at 08:36

## 2024-07-01 RX ADMIN — METOPROLOL SUCCINATE 25 MILLIGRAM(S): 50 TABLET, EXTENDED RELEASE ORAL at 15:17

## 2024-07-01 RX ADMIN — POLYETHYLENE GLYCOL 3350 17 GRAM(S): 17 POWDER, FOR SOLUTION ORAL at 12:46

## 2024-07-01 RX ADMIN — METOPROLOL SUCCINATE 25 MILLIGRAM(S): 50 TABLET, EXTENDED RELEASE ORAL at 05:11

## 2024-07-01 RX ADMIN — Medication 650 MILLIGRAM(S): at 03:45

## 2024-07-01 RX ADMIN — Medication 650 MILLIGRAM(S): at 21:00

## 2024-07-01 RX ADMIN — LIDOCAINE HYDROCHLORIDE 1 PATCH: 20 JELLY TOPICAL at 04:02

## 2024-07-01 RX ADMIN — Medication 1 TABLET(S): at 12:46

## 2024-07-02 PROCEDURE — 99232 SBSQ HOSP IP/OBS MODERATE 35: CPT

## 2024-07-02 RX ORDER — POLYETHYLENE GLYCOL 3350 17 G/17G
17 POWDER, FOR SOLUTION ORAL
Refills: 0 | Status: DISCONTINUED | OUTPATIENT
Start: 2024-07-02 | End: 2024-07-06

## 2024-07-02 RX ORDER — LACTULOSE 10 G/15ML
20 SOLUTION ORAL ONCE
Refills: 0 | Status: COMPLETED | OUTPATIENT
Start: 2024-07-02 | End: 2024-07-02

## 2024-07-02 RX ORDER — BISACODYL 5 MG
10 TABLET, DELAYED RELEASE (ENTERIC COATED) ORAL DAILY
Refills: 0 | Status: DISCONTINUED | OUTPATIENT
Start: 2024-07-02 | End: 2024-07-06

## 2024-07-02 RX ADMIN — Medication 650 MILLIGRAM(S): at 10:08

## 2024-07-02 RX ADMIN — LIDOCAINE HYDROCHLORIDE 1 PATCH: 20 JELLY TOPICAL at 19:39

## 2024-07-02 RX ADMIN — Medication 650 MILLIGRAM(S): at 14:30

## 2024-07-02 RX ADMIN — LIDOCAINE HYDROCHLORIDE 1 PATCH: 20 JELLY TOPICAL at 19:38

## 2024-07-02 RX ADMIN — Medication 650 MILLIGRAM(S): at 23:10

## 2024-07-02 RX ADMIN — METOPROLOL SUCCINATE 25 MILLIGRAM(S): 50 TABLET, EXTENDED RELEASE ORAL at 22:16

## 2024-07-02 RX ADMIN — Medication 1 TABLET(S): at 12:34

## 2024-07-02 RX ADMIN — Medication 3 MILLIGRAM(S): at 22:16

## 2024-07-02 RX ADMIN — LIDOCAINE HYDROCHLORIDE 1 PATCH: 20 JELLY TOPICAL at 12:34

## 2024-07-02 RX ADMIN — METOPROLOL SUCCINATE 25 MILLIGRAM(S): 50 TABLET, EXTENDED RELEASE ORAL at 13:50

## 2024-07-02 RX ADMIN — ENOXAPARIN SODIUM 40 MILLIGRAM(S): 100 INJECTION SUBCUTANEOUS at 12:34

## 2024-07-02 RX ADMIN — Medication 650 MILLIGRAM(S): at 22:16

## 2024-07-02 RX ADMIN — LIDOCAINE HYDROCHLORIDE 1 PATCH: 20 JELLY TOPICAL at 01:30

## 2024-07-02 RX ADMIN — LIDOCAINE HYDROCHLORIDE 1 PATCH: 20 JELLY TOPICAL at 19:36

## 2024-07-02 RX ADMIN — METOPROLOL SUCCINATE 25 MILLIGRAM(S): 50 TABLET, EXTENDED RELEASE ORAL at 05:31

## 2024-07-02 RX ADMIN — Medication 650 MILLIGRAM(S): at 11:00

## 2024-07-02 RX ADMIN — Medication 650 MILLIGRAM(S): at 13:48

## 2024-07-02 RX ADMIN — LIDOCAINE HYDROCHLORIDE 1 PATCH: 20 JELLY TOPICAL at 07:37

## 2024-07-03 LAB
ANION GAP SERPL CALC-SCNC: 6 MMOL/L — SIGNIFICANT CHANGE UP (ref 5–17)
BUN SERPL-MCNC: 22 MG/DL — SIGNIFICANT CHANGE UP (ref 7–23)
CALCIUM SERPL-MCNC: 9.4 MG/DL — SIGNIFICANT CHANGE UP (ref 8.4–10.5)
CHLORIDE SERPL-SCNC: 106 MMOL/L — SIGNIFICANT CHANGE UP (ref 96–108)
CO2 SERPL-SCNC: 30 MMOL/L — SIGNIFICANT CHANGE UP (ref 22–31)
CREAT SERPL-MCNC: 0.85 MG/DL — SIGNIFICANT CHANGE UP (ref 0.5–1.3)
EGFR: 66 ML/MIN/1.73M2 — SIGNIFICANT CHANGE UP
EGFR: 66 ML/MIN/1.73M2 — SIGNIFICANT CHANGE UP
GLUCOSE SERPL-MCNC: 102 MG/DL — HIGH (ref 70–99)
HCT VFR BLD CALC: 38.4 % — SIGNIFICANT CHANGE UP (ref 34.5–45)
HGB BLD-MCNC: 12.6 G/DL — SIGNIFICANT CHANGE UP (ref 11.5–15.5)
MCHC RBC-ENTMCNC: 30.8 PG — SIGNIFICANT CHANGE UP (ref 27–34)
MCHC RBC-ENTMCNC: 32.8 GM/DL — SIGNIFICANT CHANGE UP (ref 32–36)
MCV RBC AUTO: 93.9 FL — SIGNIFICANT CHANGE UP (ref 80–100)
NRBC # BLD: 0 /100 WBCS — SIGNIFICANT CHANGE UP (ref 0–0)
NRBC BLD-RTO: 0 /100 WBCS — SIGNIFICANT CHANGE UP (ref 0–0)
PLATELET # BLD AUTO: 310 K/UL — SIGNIFICANT CHANGE UP (ref 150–400)
POTASSIUM SERPL-MCNC: 4.5 MMOL/L — SIGNIFICANT CHANGE UP (ref 3.5–5.3)
POTASSIUM SERPL-SCNC: 4.5 MMOL/L — SIGNIFICANT CHANGE UP (ref 3.5–5.3)
RBC # BLD: 4.09 M/UL — SIGNIFICANT CHANGE UP (ref 3.8–5.2)
RBC # FLD: 13.6 % — SIGNIFICANT CHANGE UP (ref 10.3–14.5)
SODIUM SERPL-SCNC: 142 MMOL/L — SIGNIFICANT CHANGE UP (ref 135–145)
WBC # BLD: 7.46 K/UL — SIGNIFICANT CHANGE UP (ref 3.8–10.5)
WBC # FLD AUTO: 7.46 K/UL — SIGNIFICANT CHANGE UP (ref 3.8–10.5)

## 2024-07-03 PROCEDURE — 73080 X-RAY EXAM OF ELBOW: CPT | Mod: 26,RT

## 2024-07-03 PROCEDURE — 99232 SBSQ HOSP IP/OBS MODERATE 35: CPT

## 2024-07-03 RX ADMIN — TRAMADOL HYDROCHLORIDE 25 MILLIGRAM(S): 50 TABLET, FILM COATED ORAL at 18:18

## 2024-07-03 RX ADMIN — LIDOCAINE HYDROCHLORIDE 1 PATCH: 20 JELLY TOPICAL at 12:02

## 2024-07-03 RX ADMIN — Medication 650 MILLIGRAM(S): at 10:17

## 2024-07-03 RX ADMIN — LIDOCAINE HYDROCHLORIDE 1 PATCH: 20 JELLY TOPICAL at 19:41

## 2024-07-03 RX ADMIN — TRAMADOL HYDROCHLORIDE 25 MILLIGRAM(S): 50 TABLET, FILM COATED ORAL at 10:17

## 2024-07-03 RX ADMIN — METOPROLOL SUCCINATE 25 MILLIGRAM(S): 50 TABLET, EXTENDED RELEASE ORAL at 05:31

## 2024-07-03 RX ADMIN — POLYETHYLENE GLYCOL 3350 17 GRAM(S): 17 POWDER, FOR SOLUTION ORAL at 05:32

## 2024-07-03 RX ADMIN — METOPROLOL SUCCINATE 25 MILLIGRAM(S): 50 TABLET, EXTENDED RELEASE ORAL at 15:02

## 2024-07-03 RX ADMIN — Medication 650 MILLIGRAM(S): at 22:37

## 2024-07-03 RX ADMIN — Medication 650 MILLIGRAM(S): at 22:07

## 2024-07-03 RX ADMIN — ENOXAPARIN SODIUM 40 MILLIGRAM(S): 100 INJECTION SUBCUTANEOUS at 12:04

## 2024-07-03 RX ADMIN — Medication 3 MILLIGRAM(S): at 22:13

## 2024-07-03 RX ADMIN — METOPROLOL SUCCINATE 25 MILLIGRAM(S): 50 TABLET, EXTENDED RELEASE ORAL at 22:07

## 2024-07-03 RX ADMIN — TRAMADOL HYDROCHLORIDE 25 MILLIGRAM(S): 50 TABLET, FILM COATED ORAL at 17:41

## 2024-07-03 RX ADMIN — LIDOCAINE HYDROCHLORIDE 1 PATCH: 20 JELLY TOPICAL at 00:40

## 2024-07-03 RX ADMIN — Medication 650 MILLIGRAM(S): at 11:00

## 2024-07-03 RX ADMIN — TRAMADOL HYDROCHLORIDE 25 MILLIGRAM(S): 50 TABLET, FILM COATED ORAL at 11:00

## 2024-07-04 PROCEDURE — 99232 SBSQ HOSP IP/OBS MODERATE 35: CPT

## 2024-07-04 RX ORDER — DEXTROMETHORPHAN HBR, GUAIFENESIN 200 MG/10ML
600 LIQUID ORAL ONCE
Refills: 0 | Status: COMPLETED | OUTPATIENT
Start: 2024-07-04 | End: 2024-07-04

## 2024-07-04 RX ADMIN — DEXTROMETHORPHAN HBR, GUAIFENESIN 600 MILLIGRAM(S): 200 LIQUID ORAL at 11:34

## 2024-07-04 RX ADMIN — LIDOCAINE HYDROCHLORIDE 1 PATCH: 20 JELLY TOPICAL at 23:34

## 2024-07-04 RX ADMIN — LIDOCAINE HYDROCHLORIDE 1 PATCH: 20 JELLY TOPICAL at 11:34

## 2024-07-04 RX ADMIN — Medication 650 MILLIGRAM(S): at 11:33

## 2024-07-04 RX ADMIN — Medication 650 MILLIGRAM(S): at 12:33

## 2024-07-04 RX ADMIN — LIDOCAINE HYDROCHLORIDE 1 PATCH: 20 JELLY TOPICAL at 19:22

## 2024-07-04 RX ADMIN — Medication 1 TABLET(S): at 11:33

## 2024-07-04 RX ADMIN — METOPROLOL SUCCINATE 25 MILLIGRAM(S): 50 TABLET, EXTENDED RELEASE ORAL at 14:14

## 2024-07-04 RX ADMIN — Medication 650 MILLIGRAM(S): at 18:35

## 2024-07-04 RX ADMIN — Medication 650 MILLIGRAM(S): at 17:36

## 2024-07-04 RX ADMIN — LIDOCAINE HYDROCHLORIDE 1 PATCH: 20 JELLY TOPICAL at 00:07

## 2024-07-04 RX ADMIN — CYCLOBENZAPRINE HYDROCHLORIDE 5 MILLIGRAM(S): 15 CAPSULE, EXTENDED RELEASE ORAL at 14:14

## 2024-07-04 RX ADMIN — Medication 650 MILLIGRAM(S): at 21:38

## 2024-07-04 RX ADMIN — METOPROLOL SUCCINATE 25 MILLIGRAM(S): 50 TABLET, EXTENDED RELEASE ORAL at 05:56

## 2024-07-04 RX ADMIN — METOPROLOL SUCCINATE 25 MILLIGRAM(S): 50 TABLET, EXTENDED RELEASE ORAL at 21:08

## 2024-07-04 RX ADMIN — Medication 650 MILLIGRAM(S): at 06:25

## 2024-07-04 RX ADMIN — Medication 650 MILLIGRAM(S): at 05:55

## 2024-07-04 RX ADMIN — LIDOCAINE HYDROCHLORIDE 1 PATCH: 20 JELLY TOPICAL at 00:08

## 2024-07-04 RX ADMIN — Medication 650 MILLIGRAM(S): at 21:08

## 2024-07-04 RX ADMIN — ENOXAPARIN SODIUM 40 MILLIGRAM(S): 100 INJECTION SUBCUTANEOUS at 11:33

## 2024-07-05 PROCEDURE — 99232 SBSQ HOSP IP/OBS MODERATE 35: CPT

## 2024-07-05 RX ADMIN — LIDOCAINE HYDROCHLORIDE 1 PATCH: 20 JELLY TOPICAL at 19:41

## 2024-07-05 RX ADMIN — Medication 3 MILLIGRAM(S): at 21:38

## 2024-07-05 RX ADMIN — METOPROLOL SUCCINATE 25 MILLIGRAM(S): 50 TABLET, EXTENDED RELEASE ORAL at 05:32

## 2024-07-05 RX ADMIN — Medication 650 MILLIGRAM(S): at 06:02

## 2024-07-05 RX ADMIN — METOPROLOL SUCCINATE 25 MILLIGRAM(S): 50 TABLET, EXTENDED RELEASE ORAL at 14:22

## 2024-07-05 RX ADMIN — Medication 650 MILLIGRAM(S): at 11:23

## 2024-07-05 RX ADMIN — METOPROLOL SUCCINATE 25 MILLIGRAM(S): 50 TABLET, EXTENDED RELEASE ORAL at 21:38

## 2024-07-05 RX ADMIN — LIDOCAINE HYDROCHLORIDE 1 PATCH: 20 JELLY TOPICAL at 23:42

## 2024-07-05 RX ADMIN — Medication 1 TABLET(S): at 11:24

## 2024-07-05 RX ADMIN — Medication 650 MILLIGRAM(S): at 17:37

## 2024-07-05 RX ADMIN — ENOXAPARIN SODIUM 40 MILLIGRAM(S): 100 INJECTION SUBCUTANEOUS at 11:24

## 2024-07-05 RX ADMIN — Medication 650 MILLIGRAM(S): at 12:39

## 2024-07-05 RX ADMIN — LIDOCAINE HYDROCHLORIDE 1 PATCH: 20 JELLY TOPICAL at 11:24

## 2024-07-05 RX ADMIN — Medication 1 TABLET(S): at 11:23

## 2024-07-05 RX ADMIN — Medication 650 MILLIGRAM(S): at 05:32

## 2024-07-06 ENCOUNTER — TRANSCRIPTION ENCOUNTER (OUTPATIENT)
Age: 89
End: 2024-07-06

## 2024-07-06 VITALS
DIASTOLIC BLOOD PRESSURE: 96 MMHG | RESPIRATION RATE: 18 BRPM | TEMPERATURE: 98 F | HEART RATE: 93 BPM | SYSTOLIC BLOOD PRESSURE: 150 MMHG

## 2024-07-06 PROCEDURE — 97110 THERAPEUTIC EXERCISES: CPT

## 2024-07-06 PROCEDURE — 99239 HOSP IP/OBS DSCHRG MGMT >30: CPT

## 2024-07-06 PROCEDURE — 70486 CT MAXILLOFACIAL W/O DYE: CPT | Mod: MC

## 2024-07-06 PROCEDURE — 90715 TDAP VACCINE 7 YRS/> IM: CPT

## 2024-07-06 PROCEDURE — 85027 COMPLETE CBC AUTOMATED: CPT

## 2024-07-06 PROCEDURE — 85730 THROMBOPLASTIN TIME PARTIAL: CPT

## 2024-07-06 PROCEDURE — 73080 X-RAY EXAM OF ELBOW: CPT

## 2024-07-06 PROCEDURE — 85025 COMPLETE CBC W/AUTO DIFF WBC: CPT

## 2024-07-06 PROCEDURE — 71250 CT THORAX DX C-: CPT | Mod: MC

## 2024-07-06 PROCEDURE — 80053 COMPREHEN METABOLIC PANEL: CPT

## 2024-07-06 PROCEDURE — 71045 X-RAY EXAM CHEST 1 VIEW: CPT

## 2024-07-06 PROCEDURE — 99285 EMERGENCY DEPT VISIT HI MDM: CPT

## 2024-07-06 PROCEDURE — 80048 BASIC METABOLIC PNL TOTAL CA: CPT

## 2024-07-06 PROCEDURE — 97166 OT EVAL MOD COMPLEX 45 MIN: CPT

## 2024-07-06 PROCEDURE — 90471 IMMUNIZATION ADMIN: CPT

## 2024-07-06 PROCEDURE — 73070 X-RAY EXAM OF ELBOW: CPT

## 2024-07-06 PROCEDURE — 73200 CT UPPER EXTREMITY W/O DYE: CPT | Mod: MC

## 2024-07-06 PROCEDURE — 85610 PROTHROMBIN TIME: CPT

## 2024-07-06 PROCEDURE — 36415 COLL VENOUS BLD VENIPUNCTURE: CPT

## 2024-07-06 PROCEDURE — 70450 CT HEAD/BRAIN W/O DYE: CPT | Mod: MC

## 2024-07-06 PROCEDURE — 93005 ELECTROCARDIOGRAM TRACING: CPT

## 2024-07-06 PROCEDURE — 97535 SELF CARE MNGMENT TRAINING: CPT

## 2024-07-06 PROCEDURE — 73090 X-RAY EXAM OF FOREARM: CPT

## 2024-07-06 RX ORDER — LIDOCAINE HYDROCHLORIDE 20 MG/ML
1 JELLY TOPICAL
Qty: 2 | Refills: 0
Start: 2024-07-06 | End: 2024-07-15

## 2024-07-06 RX ORDER — B1/B2/B3/B5/B6/B12/VIT C/FOLIC 500-0.5 MG
1 TABLET ORAL
Qty: 30 | Refills: 0
Start: 2024-07-06 | End: 2024-08-04

## 2024-07-06 RX ORDER — OXYCODONE HYDROCHLORIDE 30 MG/1
1 TABLET ORAL
Qty: 20 | Refills: 0
Start: 2024-07-06 | End: 2024-07-10

## 2024-07-06 RX ORDER — SENNA 187 MG
1 TABLET ORAL
Qty: 30 | Refills: 0
Start: 2024-07-06 | End: 2024-08-04

## 2024-07-06 RX ORDER — SODIUM CHLORIDE 0.65 %
2 AEROSOL, SPRAY (ML) NASAL
Qty: 2 | Refills: 0
Start: 2024-07-06 | End: 2024-07-15

## 2024-07-06 RX ORDER — MAGNESIUM, ALUMINUM HYDROXIDE 200-200 MG
30 TABLET,CHEWABLE ORAL
Qty: 900 | Refills: 0
Start: 2024-07-06 | End: 2024-07-10

## 2024-07-06 RX ORDER — POLYETHYLENE GLYCOL 3350 17 G/17G
17 POWDER, FOR SOLUTION ORAL
Qty: 510 | Refills: 0
Start: 2024-07-06 | End: 2024-08-04

## 2024-07-06 RX ORDER — CYCLOBENZAPRINE HYDROCHLORIDE 15 MG/1
1 CAPSULE, EXTENDED RELEASE ORAL
Qty: 10 | Refills: 0
Start: 2024-07-06 | End: 2024-07-10

## 2024-07-06 RX ORDER — NALOXONE HYDROCHLORIDE 0.4 MG/ML
1 INJECTION, SOLUTION INTRAMUSCULAR; INTRAVENOUS; SUBCUTANEOUS
Qty: 1 | Refills: 0
Start: 2024-07-06 | End: 2024-07-06

## 2024-07-06 RX ORDER — TRAMADOL HYDROCHLORIDE 50 MG/1
1 TABLET, FILM COATED ORAL
Qty: 20 | Refills: 0
Start: 2024-07-06 | End: 2024-07-10

## 2024-07-06 RX ORDER — ACETAMINOPHEN 500 MG/5ML
2 LIQUID (ML) ORAL
Qty: 120 | Refills: 0
Start: 2024-07-06 | End: 2024-07-20

## 2024-07-06 RX ADMIN — METOPROLOL SUCCINATE 25 MILLIGRAM(S): 50 TABLET, EXTENDED RELEASE ORAL at 05:37

## 2024-07-06 RX ADMIN — Medication 1 TABLET(S): at 13:06

## 2024-07-06 RX ADMIN — Medication 650 MILLIGRAM(S): at 05:37

## 2024-07-06 RX ADMIN — Medication 650 MILLIGRAM(S): at 13:06

## 2024-07-06 RX ADMIN — Medication 650 MILLIGRAM(S): at 06:37

## 2024-07-06 RX ADMIN — LIDOCAINE HYDROCHLORIDE 1 PATCH: 20 JELLY TOPICAL at 13:07

## 2024-07-06 RX ADMIN — METOPROLOL SUCCINATE 25 MILLIGRAM(S): 50 TABLET, EXTENDED RELEASE ORAL at 13:06

## 2024-08-07 ENCOUNTER — APPOINTMENT (OUTPATIENT)
Dept: DERMATOLOGY | Facility: CLINIC | Age: 89
End: 2024-08-07

## 2024-08-07 PROBLEM — Z85.828 PERSONAL HISTORY OF SKIN CANCER: Status: ACTIVE | Noted: 2024-08-07

## 2024-08-07 PROBLEM — L57.8 ACTINIC SKIN DAMAGE: Status: ACTIVE | Noted: 2024-08-07

## 2024-08-07 PROBLEM — S51.811A LACERATION OF RIGHT FOREARM, INITIAL ENCOUNTER: Status: ACTIVE | Noted: 2024-08-07

## 2024-08-07 PROBLEM — R22.31 MASS OF SKIN OF RIGHT UPPER EXTREMITY: Status: ACTIVE | Noted: 2024-08-07

## 2024-08-07 PROCEDURE — 11102 TANGNTL BX SKIN SINGLE LES: CPT

## 2024-08-07 PROCEDURE — 99203 OFFICE O/P NEW LOW 30 MIN: CPT | Mod: 25

## 2024-08-07 NOTE — ASSESSMENT
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 No recent cutaneous exam and refuses today. Risks discussed. Actinic damage, chronic. Compliant with sun protection at present. 0.8 cm linear crust with mild pink erythema. No evidence infection. Tender. Right mid forearm. Laceration. Proximal forearm  1.1 cm thick keratotic plaque with basal erythema. Mass. ?SCC, solar ker.

## 2024-08-07 NOTE — HISTORY OF PRESENT ILLNESS
[FreeTextEntry1] : Complains of laceration of right forearm with own fingernail 5 days ago, painful. complains of new bump on right forearm proximal to laceration present for 5+ months.

## 2024-08-07 NOTE — PLAN
[TextEntry] : Apply petrolatum and dressing to laceration daily for 2 weeks. Discontinue hydrogen peroxide. F/u if not resolved. Sun protection. Biopsy of mass right forearm done.

## 2024-08-13 ENCOUNTER — NON-APPOINTMENT (OUTPATIENT)
Age: 89
End: 2024-08-13

## 2024-08-20 ENCOUNTER — NON-APPOINTMENT (OUTPATIENT)
Age: 89
End: 2024-08-20

## 2024-08-27 ENCOUNTER — APPOINTMENT (OUTPATIENT)
Dept: DERMATOLOGY | Facility: CLINIC | Age: 89
End: 2024-08-27
Payer: MEDICARE

## 2024-08-27 DIAGNOSIS — L92.0 GRANULOMA ANNULARE: ICD-10-CM

## 2024-08-27 DIAGNOSIS — D04.61 CARCINOMA IN SITU OF SKIN OF RIGHT UPPER LIMB, INCLUDING SHOULDER: ICD-10-CM

## 2024-08-27 PROCEDURE — 17262 DSTRJ MAL LES T/A/L 1.1-2.0: CPT

## 2024-08-27 PROCEDURE — 99212 OFFICE O/P EST SF 10 MIN: CPT | Mod: 25

## 2024-08-27 RX ORDER — TRIAMCINOLONE ACETONIDE 1 MG/G
0.1 CREAM TOPICAL
Qty: 1 | Refills: 1 | Status: ACTIVE | COMMUNITY
Start: 2024-08-27 | End: 1900-01-01

## 2024-08-27 NOTE — HISTORY OF PRESENT ILLNESS
[FreeTextEntry1] : Here to treat scc right forearm. [de-identified] : c/o sutures coming out on nose after fall Jun24.  c/o itchy back, long h/o granuloma annulare.

## 2024-08-27 NOTE — ASSESSMENT
[FreeTextEntry1] : 3 light colored retained sutures sticking out of intact well-healed wound right bridge of nose. Removed with gentle tug. No bleeding. No treatment.  Large multiple annular dusky erythematous plaques across back. Granuloma annulare start tac cream tid. Use prn itch. SED. Lotion applicator.

## 2024-09-24 ENCOUNTER — APPOINTMENT (OUTPATIENT)
Dept: DERMATOLOGY | Facility: CLINIC | Age: 89
End: 2024-09-24
Payer: MEDICARE

## 2024-09-24 DIAGNOSIS — Z85.828 PERSONAL HISTORY OF OTHER MALIGNANT NEOPLASM OF SKIN: ICD-10-CM

## 2024-09-24 DIAGNOSIS — L82.1 OTHER SEBORRHEIC KERATOSIS: ICD-10-CM

## 2024-09-24 PROCEDURE — 99212 OFFICE O/P EST SF 10 MIN: CPT

## 2024-09-24 NOTE — HISTORY OF PRESENT ILLNESS
[FreeTextEntry1] : f/u d&c scc right forearm. No complaints. [de-identified] : "Can I use otc cream to remove the moles on my neck?"

## 2024-09-24 NOTE — ASSESSMENT
[FreeTextEntry1] : Alert, oriented, well, pleasant.  dry and intact would with 0.3cm scab. No surrounding erythema. No evidence of recurrence or infection. No lymphadenopathy. Healing well. f/u 6 months cutaneous exam.  Brown yellow papules neck and chest. Seborrheic keratosis.  May try otc skin tag removal.

## 2024-10-11 ENCOUNTER — EMERGENCY (EMERGENCY)
Facility: HOSPITAL | Age: 89
LOS: 1 days | Discharge: HOME | End: 2024-10-11
Attending: STUDENT IN AN ORGANIZED HEALTH CARE EDUCATION/TRAINING PROGRAM | Admitting: STUDENT IN AN ORGANIZED HEALTH CARE EDUCATION/TRAINING PROGRAM
Payer: MEDICARE

## 2024-10-11 VITALS
RESPIRATION RATE: 18 BRPM | WEIGHT: 115.96 LBS | HEART RATE: 101 BPM | DIASTOLIC BLOOD PRESSURE: 100 MMHG | SYSTOLIC BLOOD PRESSURE: 194 MMHG | OXYGEN SATURATION: 100 % | HEIGHT: 65 IN | TEMPERATURE: 98 F

## 2024-10-11 VITALS
OXYGEN SATURATION: 99 % | TEMPERATURE: 98 F | RESPIRATION RATE: 17 BRPM | SYSTOLIC BLOOD PRESSURE: 152 MMHG | HEART RATE: 86 BPM | DIASTOLIC BLOOD PRESSURE: 76 MMHG

## 2024-10-11 DIAGNOSIS — Z90.710 ACQUIRED ABSENCE OF BOTH CERVIX AND UTERUS: Chronic | ICD-10-CM

## 2024-10-11 DIAGNOSIS — Z98.49 CATARACT EXTRACTION STATUS, UNSPECIFIED EYE: Chronic | ICD-10-CM

## 2024-10-11 LAB
ALBUMIN SERPL ELPH-MCNC: 3.4 G/DL — SIGNIFICANT CHANGE UP (ref 3.3–5)
ALP SERPL-CCNC: 94 U/L — SIGNIFICANT CHANGE UP (ref 40–120)
ALT FLD-CCNC: 18 U/L — SIGNIFICANT CHANGE UP (ref 10–45)
ANION GAP SERPL CALC-SCNC: 10 MMOL/L — SIGNIFICANT CHANGE UP (ref 5–17)
APTT BLD: 30.4 SEC — SIGNIFICANT CHANGE UP (ref 24.5–35.6)
AST SERPL-CCNC: 28 U/L — SIGNIFICANT CHANGE UP (ref 10–40)
BASOPHILS # BLD AUTO: 0.09 K/UL — SIGNIFICANT CHANGE UP (ref 0–0.2)
BASOPHILS NFR BLD AUTO: 1.3 % — SIGNIFICANT CHANGE UP (ref 0–2)
BILIRUB SERPL-MCNC: 0.6 MG/DL — SIGNIFICANT CHANGE UP (ref 0.2–1.2)
BUN SERPL-MCNC: 15 MG/DL — SIGNIFICANT CHANGE UP (ref 7–23)
CALCIUM SERPL-MCNC: 9.1 MG/DL — SIGNIFICANT CHANGE UP (ref 8.4–10.5)
CHLORIDE SERPL-SCNC: 105 MMOL/L — SIGNIFICANT CHANGE UP (ref 96–108)
CO2 SERPL-SCNC: 27 MMOL/L — SIGNIFICANT CHANGE UP (ref 22–31)
CREAT SERPL-MCNC: 0.85 MG/DL — SIGNIFICANT CHANGE UP (ref 0.5–1.3)
EGFR: 66 ML/MIN/1.73M2 — SIGNIFICANT CHANGE UP
EOSINOPHIL # BLD AUTO: 0.14 K/UL — SIGNIFICANT CHANGE UP (ref 0–0.5)
EOSINOPHIL NFR BLD AUTO: 2 % — SIGNIFICANT CHANGE UP (ref 0–6)
GLUCOSE SERPL-MCNC: 152 MG/DL — HIGH (ref 70–99)
HCT VFR BLD CALC: 36.9 % — SIGNIFICANT CHANGE UP (ref 34.5–45)
HGB BLD-MCNC: 12.1 G/DL — SIGNIFICANT CHANGE UP (ref 11.5–15.5)
IMM GRANULOCYTES NFR BLD AUTO: 0.3 % — SIGNIFICANT CHANGE UP (ref 0–0.9)
INR BLD: 1.07 RATIO — SIGNIFICANT CHANGE UP (ref 0.85–1.16)
LYMPHOCYTES # BLD AUTO: 2.41 K/UL — SIGNIFICANT CHANGE UP (ref 1–3.3)
LYMPHOCYTES # BLD AUTO: 34.6 % — SIGNIFICANT CHANGE UP (ref 13–44)
MAGNESIUM SERPL-MCNC: 2.1 MG/DL — SIGNIFICANT CHANGE UP (ref 1.6–2.6)
MCHC RBC-ENTMCNC: 28.3 PG — SIGNIFICANT CHANGE UP (ref 27–34)
MCHC RBC-ENTMCNC: 32.8 GM/DL — SIGNIFICANT CHANGE UP (ref 32–36)
MCV RBC AUTO: 86.2 FL — SIGNIFICANT CHANGE UP (ref 80–100)
MONOCYTES # BLD AUTO: 0.55 K/UL — SIGNIFICANT CHANGE UP (ref 0–0.9)
MONOCYTES NFR BLD AUTO: 7.9 % — SIGNIFICANT CHANGE UP (ref 2–14)
NEUTROPHILS # BLD AUTO: 3.75 K/UL — SIGNIFICANT CHANGE UP (ref 1.8–7.4)
NEUTROPHILS NFR BLD AUTO: 53.9 % — SIGNIFICANT CHANGE UP (ref 43–77)
NRBC # BLD: 0 /100 WBCS — SIGNIFICANT CHANGE UP (ref 0–0)
PLATELET # BLD AUTO: 325 K/UL — SIGNIFICANT CHANGE UP (ref 150–400)
POTASSIUM SERPL-MCNC: 4 MMOL/L — SIGNIFICANT CHANGE UP (ref 3.5–5.3)
POTASSIUM SERPL-SCNC: 4 MMOL/L — SIGNIFICANT CHANGE UP (ref 3.5–5.3)
PROT SERPL-MCNC: 6.9 G/DL — SIGNIFICANT CHANGE UP (ref 6–8.3)
PROTHROM AB SERPL-ACNC: 12.6 SEC — SIGNIFICANT CHANGE UP (ref 9.9–13.4)
RBC # BLD: 4.28 M/UL — SIGNIFICANT CHANGE UP (ref 3.8–5.2)
RBC # FLD: 15 % — HIGH (ref 10.3–14.5)
SODIUM SERPL-SCNC: 142 MMOL/L — SIGNIFICANT CHANGE UP (ref 135–145)
TROPONIN I, HIGH SENSITIVITY RESULT: 24.3 NG/L — SIGNIFICANT CHANGE UP
TROPONIN I, HIGH SENSITIVITY RESULT: 25 NG/L — SIGNIFICANT CHANGE UP
WBC # BLD: 6.96 K/UL — SIGNIFICANT CHANGE UP (ref 3.8–10.5)
WBC # FLD AUTO: 6.96 K/UL — SIGNIFICANT CHANGE UP (ref 3.8–10.5)

## 2024-10-11 PROCEDURE — 70496 CT ANGIOGRAPHY HEAD: CPT | Mod: MC

## 2024-10-11 PROCEDURE — 99285 EMERGENCY DEPT VISIT HI MDM: CPT

## 2024-10-11 PROCEDURE — 70498 CT ANGIOGRAPHY NECK: CPT | Mod: 26,MC

## 2024-10-11 PROCEDURE — 70498 CT ANGIOGRAPHY NECK: CPT | Mod: MC

## 2024-10-11 PROCEDURE — 85025 COMPLETE CBC W/AUTO DIFF WBC: CPT

## 2024-10-11 PROCEDURE — 83036 HEMOGLOBIN GLYCOSYLATED A1C: CPT

## 2024-10-11 PROCEDURE — 80061 LIPID PANEL: CPT

## 2024-10-11 PROCEDURE — 85730 THROMBOPLASTIN TIME PARTIAL: CPT

## 2024-10-11 PROCEDURE — 80053 COMPREHEN METABOLIC PANEL: CPT

## 2024-10-11 PROCEDURE — 70450 CT HEAD/BRAIN W/O DYE: CPT | Mod: 26,XU,MC

## 2024-10-11 PROCEDURE — 70450 CT HEAD/BRAIN W/O DYE: CPT | Mod: MC

## 2024-10-11 PROCEDURE — 93010 ELECTROCARDIOGRAM REPORT: CPT

## 2024-10-11 PROCEDURE — 83735 ASSAY OF MAGNESIUM: CPT

## 2024-10-11 PROCEDURE — 93005 ELECTROCARDIOGRAM TRACING: CPT

## 2024-10-11 PROCEDURE — 70496 CT ANGIOGRAPHY HEAD: CPT | Mod: 26,MC

## 2024-10-11 PROCEDURE — 36415 COLL VENOUS BLD VENIPUNCTURE: CPT

## 2024-10-11 PROCEDURE — 85610 PROTHROMBIN TIME: CPT

## 2024-10-11 PROCEDURE — 84484 ASSAY OF TROPONIN QUANT: CPT

## 2024-10-11 PROCEDURE — 99285 EMERGENCY DEPT VISIT HI MDM: CPT | Mod: 25

## 2024-10-11 RX ORDER — SODIUM CHLORIDE 0.9 % (FLUSH) 0.9 %
500 SYRINGE (ML) INJECTION ONCE
Refills: 0 | Status: COMPLETED | OUTPATIENT
Start: 2024-10-11 | End: 2024-10-11

## 2024-10-11 RX ORDER — ASPIRIN 325 MG
81 TABLET ORAL ONCE
Refills: 0 | Status: COMPLETED | OUTPATIENT
Start: 2024-10-11 | End: 2024-10-11

## 2024-10-11 RX ADMIN — Medication 500 MILLILITER(S): at 12:08

## 2024-10-11 RX ADMIN — Medication 81 MILLIGRAM(S): at 15:15

## 2024-10-11 NOTE — ED ADULT TRIAGE NOTE - CHIEF COMPLAINT QUOTE
Pt BIBA from home c/o sudden onset dizziness when she sat up stated room was spinning, evaluated by Dr. Glaser, BP elevated h/o HTN on medications, FS per

## 2024-10-11 NOTE — ED PROVIDER NOTE - PHYSICAL EXAMINATION
VITAL SIGNS: I have reviewed nursing notes and confirm.   GEN: Well-developed; well-nourished; in no acute distress. Speaking full sentences.  SKIN: Warm, pink, no rash, no diaphoresis, no cyanosis, well perfused.   HEAD: Normocephalic; atraumatic.    NECK: Supple; non tender.   EYES: Pupils 3mm equal, round, reactive to light and accomodation, conjunctiva and sclera clear. Extra-ocular movements intact bilaterally.  ENT: No nasal discharge; airway clear. Trachea is midline.    CV: RRR. S1, S2 normal; no murmurs, gallops, or rubs. Capillary refill < 2 seconds throughout. Distal pulses intact 2+ throughout.  RESP: CTA bilaterally. No wheezes, rales, or rhonchi.   ABD: Normal bowel sounds, soft, non-distended, non-tender, no rebound, no guarding, no rigidity   MSK: Normal range of motion and movement of all 4 extremities.    NEURO: Alert & oriented x 3, Grossly unremarkable. Sensory and motor intact throughout. No focal deficits. Gait: Fluid. Normal speech and coordination. NO truncal ataxia, Cerebellar ftn/hts intact b/l, no nystagmus, no rigidity.

## 2024-10-11 NOTE — ED PROVIDER NOTE - NSFOLLOWUPINSTRUCTIONS_ED_ALL_ED_FT
Rest, drink plenty of fluids.  Advance activity as tolerated.  Continue all previously prescribed medications as directed.  Follow up with your PMD 2-3 days and bring copies of your results.  Return to the ER for worsening symptoms, chest pain, shortness of breath, abdominal pain, headache, dizziness, unable to walk, or new concerning symptoms.    Take Aspirin 81mg daily.    Follow up with neurology outpatient for further evaluation of your CTA findings of LEFT M4 segment narrowing and RIGHT P1 segment narrowing, incidentally found on CT.

## 2024-10-11 NOTE — ED PROVIDER NOTE - PATIENT PORTAL LINK FT
You can access the FollowMyHealth Patient Portal offered by [facility] by registering at the following website: [email or web address]. By joining [employer]’s FollowMyHealth portal, you will also be able to view your health information using other applications (apps) compatible with our system.

## 2024-10-11 NOTE — ED PROVIDER NOTE - NSDCPRINTRESULTS_ED_ALL_ED
Patient requests all Lab, Cardiology, and Radiology Results on their Discharge Instructions patient refused

## 2024-10-11 NOTE — ED PROVIDER NOTE - OBJECTIVE STATEMENT
89F PMHX of aortic aneurysm, HTN, HLD, s/p hysterectomy, prior vertigo presenting with dizziness starting 1 hour PTA. Describes sitting at home, and then having a few seconds of "room spinning" that resolved with rest and closing her eyes. She was able to walk after and called EMS. In the ED, she is feeling anxious and does not have any dizziness or room spinning or truncal ataxia.     Denies any chest pain, abdominal pain, shortness of breath, nausea/vomiting, headaches, fevers, chills,  weakness, syncope,   urinary symptoms, subjective neurological deficits.

## 2024-10-11 NOTE — ED ADULT NURSE NOTE - NS TRANSFER PATIENT BELONGINGS
Patient Seen in: [facility] Emergency Department      History     Chief Complaint   Patient presents with    Headache    Vision Problem     Stated Complaint: left sided headache/vision changes    Subjective:   HPI        Objective:   History reviewed. No pertinent past medical history.           History reviewed. No pertinent surgical history.             Social History     Socioeconomic History    Marital status:    Tobacco Use    Smoking status: Every Day     Current packs/day: 0.50     Average packs/day: 0.5 packs/day for 57.0 years (28.5 ttl pk-yrs)     Types: Cigarettes     Start date: 6/26/1967    Smokeless tobacco: Never   Vaping Use    Vaping status: Never Used   Substance and Sexual Activity    Alcohol use: No    Drug use: No    Sexual activity: Not Currently     Social Determinants of Health      Received from [facility]              Review of Systems    Positive for stated complaint: left sided headache/vision changes  Other systems are as noted in HPI.  Constitutional and vital signs reviewed.      All other systems reviewed and negative except as noted above.    Physical Exam     ED Triage Vitals [06/20/24 1223]   /68   Pulse 68   Resp 16   Temp 98.4 °F (36.9 °C)   Temp src Temporal   SpO2 97 %   O2 Device None (Room air)       Current Vitals:   Vital Signs  BP: 127/64  Pulse: 62  Resp: 17  Temp: 98.4 °F (36.9 °C)  Temp src: Temporal  MAP (mmHg): 82    Oxygen Therapy  SpO2: 100 %  O2 Device: None (Room air)            Physical Exam          ED Course     Labs Reviewed   COMP METABOLIC PANEL (14) - Abnormal; Notable for the following components:       Result Value    Glucose 107 (*)     BUN 7 (*)     All other components within normal limits   POCT GLUCOSE - Abnormal; Notable for the following components:    POC Glucose  107 (*)     All other components within normal limits   TROPONIN I HIGH SENSITIVITY - Normal   CBC WITH DIFFERENTIAL WITH PLATELET    Narrative:      The following orders were created for panel order CBC With Differential With Platelet.  Procedure                               Abnormality         Status                     ---------                               -----------         ------                     CBC W/ DIFFERENTIAL[[id number]]                              Final result                 Please view results for these tests on the individual orders.   CBC W/ DIFFERENTIAL     EKG    Rate, intervals and axes as noted on EKG Report.  Rate: 65  Rhythm: Sinus Rhythm  Reading: Normal sinus rhythm without signs of acute ischemia or abnormal intervals.                            MDM      74-year-old female with a history of emphysema presents today with headache.  Patient states that she had a fall 5 days ago after becoming lightheaded while standing.  She had no specific head injury with the fall but has had some left temporal pain since then.  She states she is intermittently had some double vision since then as well.  Denies any ongoing lightheadedness, chest pain, heart palpitations, fever/chills, extremity pain/swelling, or any other numbness, weakness, facial droop, or other neurologic symptoms.  No current double vision.    On exam, vitals normal, well-appearing, PERRLA, EOMI, no facial droop, strength and sensation intact in the upper and lower extremities, reassuring cardiopulmonary exam, benign abdomen, mild edema in the ankles bilaterally    Differential: Syncope, orthostatic symptoms, headache, considered but low likelihood stroke.    Patient evaluated with labs including screening troponin which were reassuring.  She received noncontrast head CT without acute findings.  I independently reviewed the patient's chest x-ray. No clear evidence of consolidation or pneumothorax.    Patient was given Tylenol, Benadryl, and Compazine which resolved her headache.  On reexamination, well-appearing and relatively asymptomatic.    Plan for DC for close PMD  follow-up with careful return precautions.                                   Medical Decision Making      Disposition and Plan     Clinical Impression:  1. Left-sided headache         Disposition:  Discharge  6/20/2024  3:02 pm    Follow-up:  Cristel Arias MD  [address] [place] [id number]  [phone]    Follow up in 1 week(s)      We recommend that you schedule follow up care with a primary care provider within the next three months to obtain basic health screening including reassessment of your blood pressure.      Medications Prescribed:  Discharge Medication List as of 6/20/2024  3:03 PM                                          Clothing

## 2024-10-11 NOTE — ED ADULT NURSE NOTE - OBJECTIVE STATEMENT
Patient brought to ED via EMS for complaint of dizziness this morning that self resolved. Alert and oriented x 4. No nausea, vomiting SOB.

## 2024-10-11 NOTE — ED ADULT TRIAGE NOTE - HEART RATE (BEATS/MIN)
Family Psychotherapy (PhD/LCSW)    12/12/2019    Site: [facility]    Length of service: 30    Therapeutic intervention: [id number]-Family therapy with patient; needed because of meeting her for the first time and the father, and follow-up.    Persons present: patient and father     Interval history: saw them together and then her alone and went over plan, history, father and mother communicate well, father gone a lot due to job, have separate lives and residences, and how to continue and adding therapy all focused on, behavior, lying, and anxiety, and tempers all focused on.and being worried a lot addressed also and school and peer issues.    Target symptoms: depression, distractability, lack of focus, anxiety , adjustment     Patient's interpersonal/verbal exchanges: 00359-Family therapy with patient:  active listening, frequent questions and self-disclosure    Progress toward goals: progressing slowly    Diagnosis: ADHD inattentive type, rule out MIGUELINA    Plan: individual psychotherapy  family psychotherapy  consult psychiatrist for medication evaluation    Return to clinic: 2 weeks   101

## 2024-10-11 NOTE — ED ADULT TRIAGE NOTE - BEFAST SPEECH SLURRED
Auth was submitted this week for the medication through his [employer] insurance twice once for brand second for the generic and the determination was that it was closed/withdrawn since it's a covered medication it just has a high cost of $270 for a two week fill     Looked into a patient assistance program for medicare patients there isn't any and tried to use a copay card with the pharmacy they said they aren't valid and the cost is still high     Patient's ID Number:  [id number]    Called and spoke to patient to relay the update he stated he hasn't been taking it for almost two weeks now and feels no different he will have constipation on/off which he uses the miralax to relieve but isn't having any pain or symptoms            No

## 2024-10-11 NOTE — ED PROVIDER NOTE - CARE PROVIDER_API CALL
Abraham Duff  Neurology  [address] [place] [address], [address]  [place] [address]  Phone: [phone]  Fax: [phone]  Follow Up Time:

## 2024-10-12 LAB
A1C WITH ESTIMATED AVERAGE GLUCOSE RESULT: 6 % — HIGH (ref 4–5.6)
CHOLEST SERPL-MCNC: 190 MG/DL — SIGNIFICANT CHANGE UP
ESTIMATED AVERAGE GLUCOSE: 126 MG/DL — HIGH (ref 68–114)
HDLC SERPL-MCNC: 48 MG/DL — LOW
LIPID PNL WITH DIRECT LDL SERPL: 111 MG/DL — HIGH
NON HDL CHOLESTEROL: 143 MG/DL — HIGH
TRIGL SERPL-MCNC: 179 MG/DL — HIGH

## 2024-10-15 NOTE — CHART NOTE - NSCHARTNOTEFT_GEN_A_CORE
Patient is an 88 y/o female presented to  ED on 10/11 for vertigo and was recommended to f/u with a neurologist and PCP post discharge. Patient declined any concerns and/or SW assistance with scheduling recommended appts with MDs. Patient reported she wants to see her ENT first on 10/17 before seeing a neurologist. SW provided contact information if in need of any SW assistance.

## 2024-10-29 ENCOUNTER — NON-APPOINTMENT (OUTPATIENT)
Age: 89
End: 2024-10-29

## 2024-10-29 ENCOUNTER — APPOINTMENT (OUTPATIENT)
Dept: CARDIOLOGY | Facility: CLINIC | Age: 89
End: 2024-10-29
Payer: MEDICARE

## 2024-10-29 VITALS
OXYGEN SATURATION: 97 % | HEIGHT: 64 IN | DIASTOLIC BLOOD PRESSURE: 73 MMHG | BODY MASS INDEX: 19.63 KG/M2 | WEIGHT: 115 LBS | RESPIRATION RATE: 19 BRPM | HEART RATE: 80 BPM | SYSTOLIC BLOOD PRESSURE: 120 MMHG

## 2024-10-29 DIAGNOSIS — I25.10 ATHEROSCLEROTIC HEART DISEASE OF NATIVE CORONARY ARTERY W/OUT ANGINA PECTORIS: ICD-10-CM

## 2024-10-29 DIAGNOSIS — I35.1 NONRHEUMATIC AORTIC (VALVE) INSUFFICIENCY: ICD-10-CM

## 2024-10-29 DIAGNOSIS — E78.5 HYPERLIPIDEMIA, UNSPECIFIED: ICD-10-CM

## 2024-10-29 DIAGNOSIS — I71.9 AORTIC ANEURYSM OF UNSPECIFIED SITE, W/OUT RUPTURE: ICD-10-CM

## 2024-10-29 PROCEDURE — 99213 OFFICE O/P EST LOW 20 MIN: CPT | Mod: 25

## 2024-10-29 PROCEDURE — 93000 ELECTROCARDIOGRAM COMPLETE: CPT

## 2025-02-11 ENCOUNTER — NON-APPOINTMENT (OUTPATIENT)
Age: 89
End: 2025-02-11

## 2025-03-18 ENCOUNTER — TRANSCRIPTION ENCOUNTER (OUTPATIENT)
Age: 89
End: 2025-03-18

## 2025-03-18 ENCOUNTER — INPATIENT (INPATIENT)
Facility: HOSPITAL | Age: 89
LOS: 2 days | Discharge: HOME | DRG: 379 | End: 2025-03-21
Attending: FAMILY MEDICINE | Admitting: STUDENT IN AN ORGANIZED HEALTH CARE EDUCATION/TRAINING PROGRAM
Payer: MEDICARE

## 2025-03-18 VITALS
RESPIRATION RATE: 18 BRPM | TEMPERATURE: 97 F | OXYGEN SATURATION: 97 % | HEART RATE: 101 BPM | WEIGHT: 119.93 LBS | HEIGHT: 65 IN | SYSTOLIC BLOOD PRESSURE: 131 MMHG | DIASTOLIC BLOOD PRESSURE: 84 MMHG

## 2025-03-18 DIAGNOSIS — K57.31 DIVERTICULOSIS OF LARGE INTESTINE WITHOUT PERFORATION OR ABSCESS WITH BLEEDING: ICD-10-CM

## 2025-03-18 DIAGNOSIS — Z98.49 CATARACT EXTRACTION STATUS, UNSPECIFIED EYE: Chronic | ICD-10-CM

## 2025-03-18 DIAGNOSIS — Z90.710 ACQUIRED ABSENCE OF BOTH CERVIX AND UTERUS: Chronic | ICD-10-CM

## 2025-03-18 DIAGNOSIS — K62.5 HEMORRHAGE OF ANUS AND RECTUM: ICD-10-CM

## 2025-03-18 LAB
ABO RH CONFIRMATION: SIGNIFICANT CHANGE UP
ALBUMIN SERPL ELPH-MCNC: 3.4 G/DL — SIGNIFICANT CHANGE UP (ref 3.3–5)
ALP SERPL-CCNC: 98 U/L — SIGNIFICANT CHANGE UP (ref 40–120)
ALT FLD-CCNC: 21 U/L — SIGNIFICANT CHANGE UP (ref 10–45)
ANION GAP SERPL CALC-SCNC: 10 MMOL/L — SIGNIFICANT CHANGE UP (ref 5–17)
ANION GAP SERPL CALC-SCNC: 10 MMOL/L — SIGNIFICANT CHANGE UP (ref 5–17)
APTT BLD: 30.5 SEC — SIGNIFICANT CHANGE UP (ref 24.5–35.6)
AST SERPL-CCNC: 22 U/L — SIGNIFICANT CHANGE UP (ref 10–40)
BASOPHILS # BLD AUTO: 0.1 K/UL — SIGNIFICANT CHANGE UP (ref 0–0.2)
BASOPHILS # BLD AUTO: 0.12 K/UL — SIGNIFICANT CHANGE UP (ref 0–0.2)
BLD GP AB SCN SERPL QL: SIGNIFICANT CHANGE UP
BUN SERPL-MCNC: 13 MG/DL — SIGNIFICANT CHANGE UP (ref 7–23)
BUN SERPL-MCNC: 16 MG/DL — SIGNIFICANT CHANGE UP (ref 7–23)
CALCIUM SERPL-MCNC: 7.6 MG/DL — LOW (ref 8.4–10.5)
CALCIUM SERPL-MCNC: 8.5 MG/DL — SIGNIFICANT CHANGE UP (ref 8.4–10.5)
CHLORIDE SERPL-SCNC: 106 MMOL/L — SIGNIFICANT CHANGE UP (ref 96–108)
CHLORIDE SERPL-SCNC: 109 MMOL/L — HIGH (ref 96–108)
CO2 SERPL-SCNC: 21 MMOL/L — LOW (ref 22–31)
CO2 SERPL-SCNC: 26 MMOL/L — SIGNIFICANT CHANGE UP (ref 22–31)
CREAT SERPL-MCNC: 0.53 MG/DL — SIGNIFICANT CHANGE UP (ref 0.5–1.3)
CREAT SERPL-MCNC: 0.76 MG/DL — SIGNIFICANT CHANGE UP (ref 0.5–1.3)
EGFR: 75 ML/MIN/1.73M2 — SIGNIFICANT CHANGE UP
EGFR: 75 ML/MIN/1.73M2 — SIGNIFICANT CHANGE UP
EGFR: 89 ML/MIN/1.73M2 — SIGNIFICANT CHANGE UP
EGFR: 89 ML/MIN/1.73M2 — SIGNIFICANT CHANGE UP
EOSINOPHIL # BLD AUTO: 0.11 K/UL — SIGNIFICANT CHANGE UP (ref 0–0.5)
EOSINOPHIL # BLD AUTO: 0.22 K/UL — SIGNIFICANT CHANGE UP (ref 0–0.5)
EOSINOPHIL NFR BLD AUTO: 1.5 % — SIGNIFICANT CHANGE UP (ref 0–6)
GLUCOSE BLDC GLUCOMTR-MCNC: 129 MG/DL — HIGH (ref 70–99)
GLUCOSE SERPL-MCNC: 113 MG/DL — HIGH (ref 70–99)
GLUCOSE SERPL-MCNC: 99 MG/DL — SIGNIFICANT CHANGE UP (ref 70–99)
HCT VFR BLD CALC: 30.1 % — LOW (ref 34.5–45)
HCT VFR BLD CALC: 33.2 % — LOW (ref 34.5–45)
HCT VFR BLD CALC: 34.7 % — SIGNIFICANT CHANGE UP (ref 34.5–45)
HCT VFR BLD CALC: 38.3 % — SIGNIFICANT CHANGE UP (ref 34.5–45)
HCT VFR BLD CALC: 38.9 % — SIGNIFICANT CHANGE UP (ref 34.5–45)
HGB BLD-MCNC: 10.5 G/DL — LOW (ref 11.5–15.5)
HGB BLD-MCNC: 12.8 G/DL — SIGNIFICANT CHANGE UP (ref 11.5–15.5)
HGB BLD-MCNC: 13 G/DL — SIGNIFICANT CHANGE UP (ref 11.5–15.5)
HGB BLD-MCNC: 9.5 G/DL — LOW (ref 11.5–15.5)
IMM GRANULOCYTES NFR BLD AUTO: 0.3 % — SIGNIFICANT CHANGE UP (ref 0–0.9)
IMM GRANULOCYTES NFR BLD AUTO: 0.4 % — SIGNIFICANT CHANGE UP (ref 0–0.9)
LIDOCAIN IGE QN: 23 U/L — SIGNIFICANT CHANGE UP (ref 16–77)
LYMPHOCYTES # BLD AUTO: 1.81 K/UL — SIGNIFICANT CHANGE UP (ref 1–3.3)
LYMPHOCYTES # BLD AUTO: 1.95 K/UL — SIGNIFICANT CHANGE UP (ref 1–3.3)
LYMPHOCYTES # BLD AUTO: 24.4 % — SIGNIFICANT CHANGE UP (ref 13–44)
LYMPHOCYTES # BLD AUTO: 31.3 % — SIGNIFICANT CHANGE UP (ref 13–44)
MAGNESIUM SERPL-MCNC: 2.1 MG/DL — SIGNIFICANT CHANGE UP (ref 1.6–2.6)
MCHC RBC-ENTMCNC: 27.6 PG — SIGNIFICANT CHANGE UP (ref 27–34)
MCHC RBC-ENTMCNC: 27.8 PG — SIGNIFICANT CHANGE UP (ref 27–34)
MCHC RBC-ENTMCNC: 28.6 PG — SIGNIFICANT CHANGE UP (ref 27–34)
MCHC RBC-ENTMCNC: 28.7 PG — SIGNIFICANT CHANGE UP (ref 27–34)
MCHC RBC-ENTMCNC: 31.6 G/DL — LOW (ref 32–36)
MCHC RBC-ENTMCNC: 31.6 G/DL — LOW (ref 32–36)
MCHC RBC-ENTMCNC: 32.6 G/DL — SIGNIFICANT CHANGE UP (ref 32–36)
MCHC RBC-ENTMCNC: 33.4 G/DL — SIGNIFICANT CHANGE UP (ref 32–36)
MCV RBC AUTO: 85.3 FL — SIGNIFICANT CHANGE UP (ref 80–100)
MCV RBC AUTO: 85.9 FL — SIGNIFICANT CHANGE UP (ref 80–100)
MCV RBC AUTO: 87.1 FL — SIGNIFICANT CHANGE UP (ref 80–100)
MCV RBC AUTO: 87.5 FL — SIGNIFICANT CHANGE UP (ref 80–100)
MONOCYTES # BLD AUTO: 0.6 K/UL — SIGNIFICANT CHANGE UP (ref 0–0.9)
MONOCYTES # BLD AUTO: 0.65 K/UL — SIGNIFICANT CHANGE UP (ref 0–0.9)
MONOCYTES NFR BLD AUTO: 8.8 % — SIGNIFICANT CHANGE UP (ref 2–14)
MONOCYTES NFR BLD AUTO: 9.6 % — SIGNIFICANT CHANGE UP (ref 2–14)
NEUTROPHILS # BLD AUTO: 3.35 K/UL — SIGNIFICANT CHANGE UP (ref 1.8–7.4)
NEUTROPHILS # BLD AUTO: 4.69 K/UL — SIGNIFICANT CHANGE UP (ref 1.8–7.4)
NEUTROPHILS NFR BLD AUTO: 53.7 % — SIGNIFICANT CHANGE UP (ref 43–77)
NEUTROPHILS NFR BLD AUTO: 63.3 % — SIGNIFICANT CHANGE UP (ref 43–77)
NRBC BLD AUTO-RTO: 0 /100 WBCS — SIGNIFICANT CHANGE UP (ref 0–0)
PHOSPHATE SERPL-MCNC: 2.8 MG/DL — SIGNIFICANT CHANGE UP (ref 2.5–4.5)
PLATELET # BLD AUTO: 264 K/UL — SIGNIFICANT CHANGE UP (ref 150–400)
PLATELET # BLD AUTO: 265 K/UL — SIGNIFICANT CHANGE UP (ref 150–400)
PLATELET # BLD AUTO: 286 K/UL — SIGNIFICANT CHANGE UP (ref 150–400)
PLATELET # BLD AUTO: 322 K/UL — SIGNIFICANT CHANGE UP (ref 150–400)
POTASSIUM SERPL-MCNC: 3.4 MMOL/L — LOW (ref 3.5–5.3)
POTASSIUM SERPL-MCNC: 3.9 MMOL/L — SIGNIFICANT CHANGE UP (ref 3.5–5.3)
POTASSIUM SERPL-SCNC: 3.4 MMOL/L — LOW (ref 3.5–5.3)
PROT SERPL-MCNC: 6.7 G/DL — SIGNIFICANT CHANGE UP (ref 6–8.3)
PROTHROM AB SERPL-ACNC: 12.8 SEC — SIGNIFICANT CHANGE UP (ref 9.9–13.4)
RBC # BLD: 3.44 M/UL — LOW (ref 3.8–5.2)
RBC # BLD: 3.81 M/UL — SIGNIFICANT CHANGE UP (ref 3.8–5.2)
RBC # BLD: 4.07 M/UL — SIGNIFICANT CHANGE UP (ref 3.8–5.2)
RBC # BLD: 4.47 M/UL — SIGNIFICANT CHANGE UP (ref 3.8–5.2)
RBC # BLD: 4.53 M/UL — SIGNIFICANT CHANGE UP (ref 3.8–5.2)
RBC # FLD: 15.2 % — HIGH (ref 10.3–14.5)
RBC # FLD: 15.4 % — HIGH (ref 10.3–14.5)
RBC # FLD: 15.9 % — HIGH (ref 10.3–14.5)
RBC # FLD: 16 % — HIGH (ref 10.3–14.5)
RBC # FLD: 16.1 % — HIGH (ref 10.3–14.5)
SODIUM SERPL-SCNC: 140 MMOL/L — SIGNIFICANT CHANGE UP (ref 135–145)
SODIUM SERPL-SCNC: 142 MMOL/L — SIGNIFICANT CHANGE UP (ref 135–145)
WBC # BLD: 6.24 K/UL — SIGNIFICANT CHANGE UP (ref 3.8–10.5)
WBC # BLD: 6.34 K/UL — SIGNIFICANT CHANGE UP (ref 3.8–10.5)
WBC # BLD: 7.41 K/UL — SIGNIFICANT CHANGE UP (ref 3.8–10.5)
WBC # BLD: 8.94 K/UL — SIGNIFICANT CHANGE UP (ref 3.8–10.5)
WBC # BLD: 9.64 K/UL — SIGNIFICANT CHANGE UP (ref 3.8–10.5)
WBC # FLD AUTO: 6.24 K/UL — SIGNIFICANT CHANGE UP (ref 3.8–10.5)
WBC # FLD AUTO: 6.34 K/UL — SIGNIFICANT CHANGE UP (ref 3.8–10.5)
WBC # FLD AUTO: 7.41 K/UL — SIGNIFICANT CHANGE UP (ref 3.8–10.5)
WBC # FLD AUTO: 8.94 K/UL — SIGNIFICANT CHANGE UP (ref 3.8–10.5)
WBC # FLD AUTO: 9.64 K/UL — SIGNIFICANT CHANGE UP (ref 3.8–10.5)

## 2025-03-18 PROCEDURE — 99291 CRITICAL CARE FIRST HOUR: CPT

## 2025-03-18 PROCEDURE — 99223 1ST HOSP IP/OBS HIGH 75: CPT | Mod: 25

## 2025-03-18 PROCEDURE — 74174 CTA ABD&PLVS W/CONTRAST: CPT | Mod: 26

## 2025-03-18 PROCEDURE — 99223 1ST HOSP IP/OBS HIGH 75: CPT

## 2025-03-18 PROCEDURE — 99285 EMERGENCY DEPT VISIT HI MDM: CPT

## 2025-03-18 PROCEDURE — 45330 DIAGNOSTIC SIGMOIDOSCOPY: CPT | Mod: 59

## 2025-03-18 PROCEDURE — 99222 1ST HOSP IP/OBS MODERATE 55: CPT

## 2025-03-18 RX ORDER — MAGNESIUM, ALUMINUM HYDROXIDE 200-200 MG
30 TABLET,CHEWABLE ORAL EVERY 4 HOURS
Refills: 0 | Status: DISCONTINUED | OUTPATIENT
Start: 2025-03-18 | End: 2025-03-21

## 2025-03-18 RX ORDER — ACETAMINOPHEN 500 MG/5ML
650 LIQUID (ML) ORAL EVERY 6 HOURS
Refills: 0 | Status: DISCONTINUED | OUTPATIENT
Start: 2025-03-18 | End: 2025-03-21

## 2025-03-18 RX ORDER — MELATONIN 5 MG
3 TABLET ORAL AT BEDTIME
Refills: 0 | Status: DISCONTINUED | OUTPATIENT
Start: 2025-03-18 | End: 2025-03-21

## 2025-03-18 RX ORDER — POLYETHYLENE GLYCOL-3350 AND ELECTROLYTES 236; 6.74; 5.86; 2.97; 22.74 G/274.31G; G/274.31G; G/274.31G; G/274.31G; G/274.31G
1000 POWDER, FOR SOLUTION ORAL ONCE
Refills: 0 | Status: COMPLETED | OUTPATIENT
Start: 2025-03-19 | End: 2025-03-19

## 2025-03-18 RX ORDER — METOPROLOL SUCCINATE 50 MG/1
25 TABLET, EXTENDED RELEASE ORAL
Refills: 0 | DISCHARGE

## 2025-03-18 RX ORDER — ONDANSETRON HCL/PF 4 MG/2 ML
4 VIAL (ML) INJECTION EVERY 8 HOURS
Refills: 0 | Status: DISCONTINUED | OUTPATIENT
Start: 2025-03-18 | End: 2025-03-21

## 2025-03-18 RX ORDER — POLYETHYLENE GLYCOL-3350 AND ELECTROLYTES 236; 6.74; 5.86; 2.97; 22.74 G/274.31G; G/274.31G; G/274.31G; G/274.31G; G/274.31G
1000 POWDER, FOR SOLUTION ORAL ONCE
Refills: 0 | Status: COMPLETED | OUTPATIENT
Start: 2025-03-18 | End: 2025-03-18

## 2025-03-18 RX ADMIN — Medication 100 MILLILITER(S): at 14:33

## 2025-03-18 RX ADMIN — POLYETHYLENE GLYCOL-3350 AND ELECTROLYTES 1000 MILLILITER(S): 236; 6.74; 5.86; 2.97; 22.74 POWDER, FOR SOLUTION ORAL at 20:31

## 2025-03-18 RX ADMIN — Medication 100 MILLIEQUIVALENT(S): at 22:50

## 2025-03-18 NOTE — CONSULT NOTE ADULT - ASSESSMENT
IMPRESSION: Lower GI hemorrhage - sigmoid diverticulum as per CTA    PLAN: NPO           Transfuse as needed           No indication for surgery at this time           If bleeding continues, may require IR consult (if stable enough for transfer)
89 year old woman with blood per rectum and imaging indicating sigmoid diverticular bleeding, currently being transferred to ICU for hemodynamic instability.
Assessment  Acute blood loss anemia  Lower GIB  Plt dysfunction due to plt use    underlying Hypertension, HLD (hyperlipidemia),     Plan  Admit to ICU  Transfuse 2 PRBC  Transfuse 1 plts  trend CBC q 4 till am   GI consult  d/w Dr. Yoo for sigmoidoscopy  will give tap water enema x 1  if continue to bleed after would need transfer to tertiary center  d/w Son bedside          PMD:				                   Notified(Date):  Family Updated: 	Son	                                 Date:  3/18/2025  Sedation & Analgesia:	n/a  Diet/Nutrition:		 Diet, NPO (03-18-25 @ 13:39) [Active]  GI PPx:			ppi daily   DVT Ppx:		Venodynes         Activity:		  Advance as tolerated  Head of Bed:               35-45 Deg  Glycemic Control:        sodium chloride 0.9%. 1000 milliLiter(s) IV Continuous <Continuous>      Lines:  PIV  CENTRAL LINE: 	[ ] YES [ ] NO	                    LOCATION:   	                       DATE INSERTED:   	                    REMOVE:  [ ] YES [ ] NO    A-LINE:  	                [ ] YES [ ] NO                      LOCATION:   	                       DATE INSERTED: 		            REMOVE:  [ ] YES [ ] NO    BLACKWELL: 		        [ ] YES [ ] NO  		                                       DATE INSERTED:		            REMOVE:  [ ] YES [ ] NO      Restraints were deemed necessary to prevent removal of life-sustaining devices [  ] YES   [ x   ]  NO    Disposition: ICU Care  Goals of Care: DNR/I

## 2025-03-18 NOTE — RAPID RESPONSE TEAM SUMMARY - NSADDTLFINDINGSRRT_GEN_ALL_CORE
on exam patient now awake, interacting, follows commands, both lungs clear, s1, s2, irregular rhythm, abdomen- soft, non tender to palpation, no pedal edema

## 2025-03-18 NOTE — ED ADULT NURSE NOTE - SUICIDE SCREENING QUESTION 1
Pt. Resting in bed. No acute distress, RR equal and non labored, VSS. Bed in low and locked position. Patient denies any needs at this time. Will continue to monitor. Pt's room secured per protocol. Pt's belongings secured and pt placed in blue gown and yellow socks. Pt being directly monitored by nupur Lipscomb at this time. Will continue to monitor.    No

## 2025-03-18 NOTE — H&P ADULT - HISTORY OF PRESENT ILLNESS
88 y/o F with PMH of CAD, arrythmia s/p ablation 2003, Hyperlipidemia, aortic aneurysm, aortic regurgitation, Hypertension, came in with c/o passing BRBPR that started yesterday night. No abdominal pain/ nausea/ vomiting. No associated chest pain/ palpitation. No fevers or chills. patient had a similar episode one month ago which resolved on its own, she did not seek medical care for it.   GI has been consulted by ER, waiting for recommendations.

## 2025-03-18 NOTE — H&P ADULT - NSHPREVIEWOFSYSTEMS_GEN_ALL_CORE
CONSTITUTIONAL: fatigue +  EYES: No eye pain  ENMT:  No difficulty hearing, tinnitus, vertigo  NECK: No pain or stiffness  RESPIRATORY: No cough, wheezing, chills or hemoptysis; No shortness of breath  CARDIOVASCULAR: No chest pain, palpitations, dizziness, or leg swelling  GASTROINTESTINAL: BRBPR +  GENITOURINARY: No dysuria  NEUROLOGICAL: No headaches  SKIN: No itching, burning, rashes, or lesions   MUSCULOSKELETAL: No joint pain or swelling; No muscle, back, or extremity pain  PSYCHIATRIC: No depression, anxiety, mood swings, or difficulty sleeping  ALLERGY AND IMMUNOLOGIC: No hives or eczema    ALL ROS REVIEWED AND NORMAL EXCEPT AS STATED ABOVE

## 2025-03-18 NOTE — ED PROVIDER NOTE - CARE PLAN
1 Principal Discharge DX:	BRBPR (bright red blood per rectum)   Principal Discharge DX:	BRBPR (bright red blood per rectum)  Secondary Diagnosis:	Lower GI bleed

## 2025-03-18 NOTE — CONSULT NOTE ADULT - TIME BILLING
All options and risks discussed; discussed with Medicine; all lab values and imaging studies reviewed

## 2025-03-18 NOTE — ED PROVIDER NOTE - PHYSICAL EXAMINATION
Vitals: I have reviewed the patients vital signs  General: nontoxic appearing  HEENT: Atraumatic, normocephalic, airway patent  Eyes: EOMI, tracking appropriately  Neck: no tracheal deviation  Chest/Lungs: no trauma, symmetric chest rise, speaking in complete sentences,  no resp distress  Heart: skin and extremities well perfused, regular rate and rhythm  Abd: soft NT ND  Rectal exam : Chaperone: Lena. No external hemorrhoids. No palpable internal hemorrhoids. + gross BRBPR.   Neuro: A+Ox3, ambulating without difficulty, appears non focal  MSK: strength at baseline in all extremities, no muscle wasting or atrophy  Skin: no cyanosis, no jaundice

## 2025-03-18 NOTE — ED PROVIDER NOTE - IV ALTEPLASE ADMIN OUTSIDE HIDDEN
2019    RE: Azam Greene  [address]  [facility] [address]                         Francy Hill MD  2019        Initial Outpatient Consultation    Medical History: We saw Azam in the Pediatric Gastroenterology clinic as a consultation from Joseph Scanlon for our medical opinion regarding CC: 6 month old with reflux. History obtained from the patient's mother and review of outside medical records. Oromo interpretor present.     Azam is a 6 month old male with no significant history who presents with concerns for reflux. Azam spits up after most feeds. His mother describes the episodes as effortless and occurring without warning. The episodes do not bother him. He does not have any pain. The refluxate looks like whatever he just ate. No blood or bile.     Breast fed. Also taking small amounts of pureed foods including cereal and carrots. He only takes a few spoonfuls at a time.     Mother is concerned that he is small for his age. We reviewed his growth chart and discussed that his length is following along the 97th percentile and his weight is also above the 50th percentile. He rolls over and is almost tripoding.     Past Medical History:   Term delivery via repeat  complicated by hyperthermia in nursery. Completed 48hr SBI r/o in NICU.   Maternal history of chronic hepatitis B. Received HGIB and hepatitis B vaccine.     History reviewed. No pertinent surgical history.    No Known Allergies    Outpatient Medications Prior to Visit   Medication Sig Dispense Refill     acetaminophen (TYLENOL) 160 MG/5ML suspension Take 4 mLs (128 mg) by mouth every 6 hours as needed for fever or mild pain (Patient not taking: Reported on 2019) 60 mL 3     clotrimazole (LOTRIMIN) 1 % cream Apply topically 4 times daily as needed (Patient not taking: Reported on 2018) 15 g 1     ibuprofen (MOTRIN CHILD DROPS) 40 MG/ML suspension Take 1 mL (40 mg) by mouth every 6  "hours as needed for moderate pain or fever (Patient not taking: Reported on 2/20/2019) 1 Bottle 1     triamcinolone (KENALOG) 0.1 % cream Apply sparingly to affected area three times daily for 14 days. (Patient not taking: Reported on 2018) 15 g 0     No facility-administered medications prior to visit.        History reviewed. No pertinent family history. Maternal hepatitis B    Social History: Lives at home with mother and 1 yo brother.     Review of Systems: Intermittent rash on face and arms. PCP thought might be eczema and placed Dermatology referral. Otherwise as above. All other systems negative per complete ROS.     Physical Exam: Ht 0.72 m (2' 4.35\")   Wt 8.55 kg (18 lb 13.6 oz)   HC 46.6 cm (18.35\")   BMI 16.49 kg/m     GEN: WDWN male infant in no acute distress. Looking around. Responds appropriately to exam.   HEENT: NC/AT. Pupils equal and round. No scleral icterus. No rhinorrhea. MMMs. Drooling.   PULM: CTAB. Breath sounds symmetric. No wheezes or crackles.  CV: RRR. Normal S1, S2. No murmurs.  ABD: round, nondistended. Normoactive bowel sounds. Soft, no tenderness to palpation. No HSM or other masses.   EXT: No deformities. WWP. Moving all four equally.   SKIN: No jaundice, bruising or petechiae on incomplete skin exam. Mild erythematous rash on right wrist with raised area and three papular 1mm lesions.   RECTAL: Appropriately placed spherical anus. No perianal skin tags, fissures or fistulas. Digital exam deferred.    Results Reviewed: None      Assessment: Azam is a 6 month old male with  1. Physiologic regurgitation  2. Appropriate growth  3. No red flags such as choking, gagging, vomiting, bilious emesis, pain, feeding difficulties or poor weight gain    Discussed with mother that physiologic regurgitation is normal and common in infants. No treatment or further evaluation is recommended. Symptoms will gradually improve over time with 90% of infants outgrowing their regurgitation by 12 " months old.     Plan:  1. Continue breast feeding and advancing solids as tolerated. No restrictions of food groups. Azam can have any pureed foods including meats.   2. Continue to follow with PCP regarding rash.  3. No follow-up scheduled at this time, although we are happy to see Azam at any time for concerns.     Thank you for this consult,    Francy Hill MD  Pediatric Gastroenterology  [facility]    Joseph Middleton     show

## 2025-03-18 NOTE — H&P ADULT - NSHPLABSRESULTS_GEN_ALL_CORE
LABS:                        10.5   7.41  )-----------( 286      ( 18 Mar 2025 10:35 )             33.2     03-18    142  |  106  |  16  ----------------------------<  113[H]  3.9   |  26  |  0.76    Ca    8.5      18 Mar 2025 08:35    TPro  6.7  /  Alb  3.4  /  TBili  0.6  /  DBili  x   /  AST  22  /  ALT  21  /  AlkPhos  98  03-18    PT/INR - ( 18 Mar 2025 08:35 )   PT: 12.8 sec;   INR: 1.09 ratio         PTT - ( 18 Mar 2025 08:35 )  PTT:30.5 sec  Urinalysis Basic - ( 18 Mar 2025 08:35 )    Color: x / Appearance: x / SG: x / pH: x  Gluc: 113 mg/dL / Ketone: x  / Bili: x / Urobili: x   Blood: x / Protein: x / Nitrite: x   Leuk Esterase: x / RBC: x / WBC x   Sq Epi: x / Non Sq Epi: x / Bacteria: x       CAPILLARY BLOOD GLUCOSE            Urinalysis Basic - ( 18 Mar 2025 08:35 )    Color: x / Appearance: x / SG: x / pH: x  Gluc: 113 mg/dL / Ketone: x  / Bili: x / Urobili: x   Blood: x / Protein: x / Nitrite: x   Leuk Esterase: x / RBC: x / WBC x   Sq Epi: x / Non Sq Epi: x / Bacteria: x    ekg reviewed by me showed sinus rhythm with PACs    CT abdomen and pelvis reviewed by me, showed active diverticular bleed.    RADIOLOGY & ADDITIONAL TESTS:    Consultant(s) Notes Reviewed:  [x ] YES  [ ] NO  Care Discussed with Consultants/Other Providers [ x] YES  [ ] NO  Imaging Personally Reviewed:  [x ] YES  [ ] NO

## 2025-03-18 NOTE — H&P ADULT - ASSESSMENT
a/p:    89 y/o F with PMH of CAD, arrythmia s/p ablation 2003, Hyperlipidemia, aortic aneurysm, aortic regurgitation, Hypertension, came in with c/o passing BRBPR ,    # Lower GI bleed -  sigmoid diverticular bleeding  - NPO  - monitor h/h q6 hrs, monitor on telemetry  - holding beta blocker, aspirin, chemical dvt prophylaxis  - type and scree, transfuse to goal Hb >8  - if becomes hemodynamically unstable, will consult ICU  - GI and surgery on board.    # HTN  - hold beta blocker    # CAD  - hold beta blocker and aspirin    # DVT prophylaxis  - scds

## 2025-03-18 NOTE — CONSULT NOTE ADULT - SUBJECTIVE AND OBJECTIVE BOX
ICU CONSULT  Location of Patient :  [facility] ( [facility])  Attending requesting Consult:Olga Kaur    Initial HPI on admission:  HPI:  89 F with PMH of CAD, arrythmia s/p ablation , Hyperlipidemia, aortic aneurysm, aortic regurgitation, Hypertension, came in with c/o passing BRBPR that started yesterday night. No abdominal pain/ nausea/ vomiting. No associated chest pain/ palpitation. No fevers or chills. patient had a similar episode one month ago which resolved on its own, she did not seek medical care for it.     BRIEF HOSPITAL COURSE:   patient admitted to medical floor  had RRT today for ams, transient  suspected vasovagal  but patient having multiple bloody bm in ED  patient feels well no cp, sob, palp, n/v,       PAST MEDICAL & SURGICAL HISTORY:  Hypertension  Arrhythmia s/p ablation-  HLD (hyperlipidemia)   UTI (urinary tract infection) chronic  S/P hysterectomy  S/P cataract surgery bilateral        Allergies    sulfa drugs (Unknown)  statins (Unknown)  Originally Entered as [Unknown] reaction to [SULFUR] (Unknown)  Macrodantin (Unknown)    Intolerances      FAMILY HISTORY:  Family history of cardiomegaly  mother- age 93    Family history of aortic aneurysm  father           Medications:  MEDICATIONS  (STANDING):  sodium chloride 0.9%. 1000 milliLiter(s) (100 mL/Hr) IV Continuous <Continuous>    MEDICATIONS  (PRN):  acetaminophen     Tablet .. 650 milliGRAM(s) Oral every 6 hours PRN Temp greater or equal to 38C (100.4F), Mild Pain (1 - 3)  aluminum hydroxide/magnesium hydroxide/simethicone Suspension 30 milliLiter(s) Oral every 4 hours PRN Dyspepsia  melatonin 3 milliGRAM(s) Oral at bedtime PRN Insomnia  ondansetron Injectable 4 milliGRAM(s) IV Push every 8 hours PRN Nausea and/or Vomiting         GI Medications  aluminum hydroxide/magnesium hydroxide/simethicone Suspension 30 milliLiter(s) Oral every 4 hours, 25 @ 11:05, Routine PRN        Home Medications:  Last Order Reconciliation Date: Not Done  aspirin 81 mg oral tablet: 1 tab(s) orally once a day (25)  METOPROLOL TARTRATE 25 MG TAB: 25 milligram(s) orally 3 times a day (25)  nortriptyline 10 mg oral capsule: 1 cap(s) orally once a day (at bedtime) (25)  One-A-Day 50+ oral tablet: 1 tab(s) orally once a day (25)        LABS:                        9.5    6.34  )-----------( 264      ( 18 Mar 2025 12:40 )             30.1         142  |  106  |  16  ----------------------------<  113[H]  3.9   |  26  |  0.76    Ca    8.5      18 Mar 2025 08:35    TPro  6.7  /  Alb  3.4  /  TBili  0.6  /  DBili  x   /  AST  22  /  ALT  21  /  AlkPhos  98  0318       WBC Trend  25 @ 12:40   -  6.34  25 @ 10:35   -  7.41  25 @ 08:35   -  6.24    H/H Trend  25 @ 12:40   -   9.5[L]/ 30.1[L]  25 @ 10:35   -   10.5[L]/ 33.2[L]  25 @ 08:35   -   11.3[L]/ 34.7    Stool Occult Blood    Platelet Trend  25 @ 12:40   -  264  25 @ 10:35   -  286  25 @ 08:35   -  322    Trend Sodium  25 @ 08:35   -  142    Trend Potassium  25 @ 08:35   -  3.9    Trend Bun/Cr  25 @ 08:35  BUN/CR -  16 / 0.76    Lactic Acid Trend    ABG Trend    Trend AST/ALT/ALK Phos/Bili  25 @ 08:35   22/21/98/0.6  1024 @ 11:40   28//94/0.6  24 @ 06:57   27//107/1.0  24 @ 19:05   28//114/0.4         Amylase / Lipase Trend  25 @ 08:35   -   -- / 23      Albumin Trend  25 @ 08:35   -   3.4  10 @ 11:40   -   3.4  24 @ 06:57   -   3.5  24 @ 19:05   -   3.7      PTT - PT - INR Trend  25 @ 08:35   -   30.5 - 12.8 - 1.09  10-11-24 @ 11:40   -   30.4 - 12.6 - 1.07  24 @ 06:57   -   33.0 - 12.3 - 1.05    Glucose Trend  25 @ 12:49   -  -- -- 129[H]  25 @ 08:35   -  113[H] -- --    A1C with Estimated Average Glucose Result: 6.0 % *H* [4.0 - 5.6] (10-11-24 @ 11:40)           RADIOLOGY  CXR:      CT:    ACC: [id number] EXAM:  CT ANGIO ABD PELV (W)AW IC   ORDERED BY: BINTA DRAPER     PROCEDURE DATE:  2025          INTERPRETATION:  CLINICAL INFORMATION: Bright red blood per rectum.    COMPARISON: Noncontrast CT of the abdomen and pelvis 2019.    CONTRAST/COMPLICATIONS:  IV Contrast: Omnipaque 350  90 cc administered   10 cc discarded  Oral Contrast: NONE  .    PROCEDURE:  CT of the Abdomen and Pelvis was performed.  Precontrast, Arterial and Delayed phases were performed.  Sagittal and coronal reformats were performed.    FINDINGS:  LOWER CHEST: Cylindrical bronchiectasis in the left lower lobe.    LIVER: Within normal limits.  BILE DUCTS: Normal caliber.  GALLBLADDER: Numerous small layering gallstones.  SPLEEN: Within normal limits.  PANCREAS: Within normal limits.  ADRENALS: Within normal limits.  KIDNEYS/URETERS: Within normal limits.    BLADDER: Within normal limits.  REPRODUCTIVE ORGANS: The uterus has been removed.    BOWEL: Mild hiatal hernia. 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 Extensive sigmoid diverticulosis. There is active arterial   extravasation from a diverticulum of the sigmoid colon on image 77 of   series 3. No extraluminal gas or drainable collection. No bowel   obstruction. The appendix is unremarkable. There is no mesenteric   adenopathy.  PERITONEUM/RETROPERITONEUM: Within normal limits.  VESSELS: Moderate atherosclerotic disease of the nonaneurysmal abdominal   aorta. 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  LYMPH NODES: No lymphadenopathy.  ABDOMINAL WALL: Within normal limits.  BONES: Healed right superior and inferior pubic rami fractures. Healed   bilateral sacral ala fractures.    IMPRESSION: Active sigmoid diverticular bleed. Results discussed with   Binta Draper at time of interpretation.    --- End of Report ---    ECHO:      VITALS:  T(C): 36.5 (25 @ 13:00), Max: 36.5 (25 @ 13:00)  T(F): 97.7 (25 @ 13:00), Max: 97.7 (25 @ 13:00)  HR: 88 (25 @ :00) (70 - 101)  BP: 122/87 (25 @ 13:00) (85/64 - 168/70)  BP(mean): 99 (25 @ :00) (69 - 119)  ABP: --  ABP(mean): --  RR: 18 (25 @ 13:00) (18 - 24)  SpO2: 100% (25 @ :00) (97% - 100%)  CVP(mm Hg): --  CVP(cm H2O): --    Ins and Outs       Height (cm): 165.1 (25 @ 08:06)  Weight (kg): 54.4 (25 @ 08:06)  BMI (kg/m2): 20 (25 @ 08:06)        I&O's Detail      Physical Examination:  GENERAL:               Alert, Oriented, No acute distress. pale  HEENT:                  No JVD, Moist MM  PULM:                     Bilateral air entry, Clear to auscultation bilaterally, no significant sputum production, No Rales, No Rhonchi, No Wheezing  CVS:                         S1, S2,  No Murmur  ABD:                        Soft, nondistended, nontender, normoactive bowel sounds,   EXT:                         No edema, nontender, No Cyanosis or Clubbing    NEURO:                  Alert, oriented, interactive, nonfocal, follows commands  PSYC:                      Calm, + Insight.    
GI Consult    HPI:  88 y/o F with PMH of CAD, arrythmia s/p ablation , Hyperlipidemia, aortic aneurysm, aortic regurgitation, Hypertension, came in with c/o passing BRBPR that started yesterday night. No abdominal pain/ nausea/ vomiting. No associated chest pain/ palpitation. No fevers or chills. patient had a similar episode one month ago which resolved on its own, she did not seek medical care for it.   Patient seen at bedside. Per ED team was mentating normally, now appears confused and acutely hypotensive. Has been passing BRBPR while in the ED.  Surgery and ICU consult appreciated.        PAST MEDICAL & SURGICAL HISTORY:  Hypertension      Arrhythmia  s/p ablation-      HLD (hyperlipidemia)      UTI (urinary tract infection)  chronic      S/P hysterectomy      S/P cataract surgery  bilateral            MEDICATIONS  (STANDING):  chlorhexidine 2% Cloths 1 Application(s) Topical <User Schedule>  pantoprazole  Injectable 40 milliGRAM(s) IV Push daily  sodium chloride 0.9%. 1000 milliLiter(s) (100 mL/Hr) IV Continuous <Continuous>    MEDICATIONS  (PRN):  acetaminophen     Tablet .. 650 milliGRAM(s) Oral every 6 hours PRN Temp greater or equal to 38C (100.4F), Mild Pain (1 - 3)  aluminum hydroxide/magnesium hydroxide/simethicone Suspension 30 milliLiter(s) Oral every 4 hours PRN Dyspepsia  melatonin 3 milliGRAM(s) Oral at bedtime PRN Insomnia  ondansetron Injectable 4 milliGRAM(s) IV Push every 8 hours PRN Nausea and/or Vomiting        Allergies    sulfa drugs (Unknown)  statins (Unknown)  Originally Entered as [Unknown] reaction to [SULFUR] (Unknown)  Macrodantin (Unknown)    Intolerances          FAMILY HISTORY:  Family history of cardiomegaly  mother- age 93    Family history of aortic aneurysm  father          Social History:  [place]      Diet, NPO (25 @ 13:39) [Active]            ROS: Unable to obtain      PHYSICAL EXAM:   Vital Signs:  Vital Signs Last 24 Hrs  T(C): 36.7 (18 Mar 2025 13:45), Max: 36.7 (18 Mar 2025 13:45)  T(F): 98.1 (18 Mar 2025 13:45), Max: 98.1 (18 Mar 2025 13:45)  HR: 74 (18 Mar 2025 13:45) (70 - 101)  BP: 160/87 (18 Mar 2025 13:45) (85/64 - 168/70)  BP(mean): 99 (18 Mar 2025 13:00) (69 - 119)  RR: 18 (18 Mar 2025 13:45) (18 - 24)  SpO2: 100% (18 Mar 2025 13:45) (97% - 100%)    Parameters below as of 18 Mar 2025 13:45  Patient On (Oxygen Delivery Method): room air      Daily Height in cm: 165.1 (18 Mar 2025 08:06)    Daily I&O's Summary      GENERAL:  Elderly, frail appearing.  HEENT: MMM, anicteric sclera  CHEST:  Clear B/L  HEART:  RRR at present  ABDOMEN:  Soft, non-tender, non-distended, +BS,  no masses palpable  EXTR:  no edema  SKIN:  +pallor. No rash or jaundice  NEURO:  Awake, nonverbal        LABS:                        9.5    6.34  )-----------( 264      ( 18 Mar 2025 12:40 )             30.1     Hemoglobin: 9.5 g/dL (.18.25 @ 12:40)   Hemoglobin: 10.5 g/dL (03.18.25 @ 10:35)   Hemoglobin: 11.3 g/dL (.18.25 @ 08:35)   Hemoglobin: 12.1 g/dL (10.11.24 @ 11:40)             142  |  106  |  16  ----------------------------<  113[H]  3.9   |  26  |  0.76    Ca    8.5      18 Mar 2025 08:35    TPro  6.7  /  Alb  3.4  /  TBili  0.6  /  DBili  x   /  AST  22  /  ALT  21  /  AlkPhos  98        PT/INR - ( 18 Mar 2025 08:35 )   PT: 12.8 sec;   INR: 1.09 ratio    PTT - ( 18 Mar 2025 08:35 )  PTT:30.5 sec      Lipase: 23 U/L ( @ 08:35)      RADIOLOGY & ADDITIONAL TESTS:    ACC: [id number] EXAM:  CT ANGIO ABD PELV (W)AW IC   ORDERED BY: BINTA T   DRAPER     PROCEDURE DATE:  2025          INTERPRETATION:  CLINICAL INFORMATION: Bright red blood per rectum.    COMPARISON: Noncontrast CT of the abdomen and pelvis 2019.    CONTRAST/COMPLICATIONS:  IV Contrast: Omnipaque 350  90 cc administered   10 cc discarded  Oral Contrast: NONE  .    PROCEDURE:  CT of the Abdomen and Pelvis was performed.  Precontrast, Arterial and Delayed phases were performed.  Sagittal and coronal reformats were performed.    FINDINGS:  LOWER CHEST: Cylindrical bronchiectasis in the left lower lobe.    LIVER: Within normal limits.  BILE DUCTS: Normal caliber.  GALLBLADDER: Numerous small layering gallstones.  SPLEEN: Within normal limits.  PANCREAS: Within normal limits.  ADRENALS: Within normal limits.  KIDNEYS/URETERS: Within normal limits.    BLADDER: Within normal limits.  REPRODUCTIVE ORGANS: The uterus has been removed.    BOWEL: Mild hiatal hernia. 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 Extensive sigmoid diverticulosis. There is active arterial   extravasation from a diverticulum of the sigmoid colon on image 77 of   series 3. No extraluminal gas or drainable collection. No bowel   obstruction. The appendix is unremarkable. There is no mesenteric   adenopathy.  PERITONEUM/RETROPERITONEUM: Within normal limits.  VESSELS: Moderate atherosclerotic disease of the nonaneurysmal abdominal   aorta. 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  LYMPH NODES: No lymphadenopathy.  ABDOMINAL WALL: Within normal limits.  BONES: Healed right superior and inferior pubic rami fractures. Healed   bilateral sacral ala fractures.    IMPRESSION: Active sigmoid diverticular bleed. Results discussed with   Binta Draper at time of interpretation.    --- End of Report ---            NISHANT STANTON MD; Attending Radiologist  This document has been electronically signed. Mar 18 2025 10:07AM  
Full note to follow        PAST MEDICAL & SURGICAL HISTORY:      PFSH: All noncontributory    MEDICATIONS REVIEWED:acetaminophen     Tablet .. 650 milliGRAM(s) Oral every 6 hours PRN  aluminum hydroxide/magnesium hydroxide/simethicone Suspension 30 milliLiter(s) Oral every 4 hours PRN  chlorhexidine 2% Cloths 1 Application(s) Topical <User Schedule>  melatonin 3 milliGRAM(s) Oral at bedtime PRN  ondansetron Injectable 4 milliGRAM(s) IV Push every 8 hours PRN  pantoprazole  Injectable 40 milliGRAM(s) IV Push daily  sodium chloride 0.9%. 1000 milliLiter(s) IV Continuous <Continuous>      ALLERGIES:sulfa drugs (Unknown)  statins (Unknown)  Originally Entered as [Unknown] reaction to [SULFUR] (Unknown)  Macrodantin (Unknown)      REVIEW OF SYSTEMS:  Comprehensive Review of Systems negative except as noted in HPI      PHYSICAL EXAMINATION:  RESPIRATORY: Clear to auscultation bilaterally, respirations non-labored.  CARDIAC: RR, normal S1S2, no murmurs.  ABDOMEN: soft, BS present, no masses, no hernias, no peritoneal signs, no KLS, nontender.  VASCULAR: Equal and normal pulses.  MUSCULOSKELETAL: Full ROM, no deformities.      T(C): 36.7 (03-18-25 @ 13:45), Max: 36.7 (03-18-25 @ 13:45)  HR: 74 (03-18-25 @ 13:45) (70 - 101)  BP: 160/87 (03-18-25 @ 13:45) (85/64 - 168/70)  RR: 18 (03-18-25 @ 13:45) (18 - 24)  SpO2: 100% (03-18-25 @ 13:45) (97% - 100%)                          9.5    6.34  )-----------( 264      ( 18 Mar 2025 12:40 )             30.1       03-18    142  |  106  |  16  ----------------------------<  113[H]  3.9   |  26  |  0.76    Ca    8.5      18 Mar 2025 08:35    TPro  6.7  /  Alb  3.4  /  TBili  0.6  /  DBili  x   /  AST  22  /  ALT  21  /  AlkPhos  98  03-18

## 2025-03-18 NOTE — H&P ADULT - NSHPPHYSICALEXAM_GEN_ALL_CORE
T(C): 36.1 (03-18-25 @ 08:06), Max: 36.1 (03-18-25 @ 08:06)  HR: 101 (03-18-25 @ 08:06) (101 - 101)  BP: 131/84 (03-18-25 @ 08:06) (131/84 - 131/84)  RR: 18 (03-18-25 @ 08:06) (18 - 18)  SpO2: 97% (03-18-25 @ 08:06) (97% - 97%)    CONSTITUTIONAL: no apparent distress  EYES: PERRLA and symmetric  ENMT: Oral mucosa moist   NECK: Supple  RESP: both lungs clear  CV: RRR, +S1S2, rhythm irregular  GI: Soft, NT, mildly distended  SKIN: No rashes or ulcers noted  NEURO: aaox3 , CN II-XII intact  PSYCH: Appropriate insight/judgment; A+O x 3, mood and affect appropriate  Can move all extremities  No pedal edema

## 2025-03-18 NOTE — ED ADULT NURSE REASSESSMENT NOTE - NS ED NURSE REASSESS COMMENT FT1
Pt with episode of syncope while talking to GI doctor, awake but not responding, RRT called. Pt hypotensive with bp 85/64. IVF initiated. Emergent blood ordered.

## 2025-03-18 NOTE — ED ADULT NURSE NOTE - COMFORT/ACCEPTABLE PAIN LEVEL (0-10)
[facility]    ICU TEAM Progress Note    Name: Dominic Welsh   : 1950   MRN: [id number]   Date: 3/24/2023      I  Subjective:   Progress Note: 3/24/2023      Reason for ICU Admission: Cardiomyopathy status post LVAD implantation     Interval history:  71year old male PMH as noted above admitted to [facility] on  due to ongoing symptoms secondary to his HFrEF. Treated for possible CAP, and diuresed for acute on chronic HFrEF. Nephrology following for TANNER on CKD 3. AHF team recommended transfer to CVICU for [facility] placement and ongoing LVAD workup, with placement 2/15. Pt extubated , however with development of worsening renal dysfunction overnight and unable to tolerate CRRT so was reintubated and taken for Impella RP placement for right-sided support in a.m. of  and CRRT resumed. Impella removed on 3/1. Overnight 3/1-3/1 CVA noted with SAH.  3/2 SAH enlarged. Held anticoagulation. Failed extubation 3/2. Trach placed 3/7. Transitioned to [facility]. Repeat CTH 3/10 w/ persistent SAH, heparin held. Overnight Events:   3/23 - tolerated 6 hours PST 15  3/24 - pain controlled this AM, will work on hearing aid batteries.  Fm meeting today    Active Problem List:     Problem List  Date Reviewed: 2023            Codes Class    Dyslipidemia, goal LDL below 70 ICD-10-CM: E78.5  ICD-9-CM: 272.4         LVAD (left ventricular assist device) present ([facility] [address] ICD-10-CM: Z95.811  ICD-9-CM: V43.21         PAD (peripheral artery disease) ([facility] ICD-10-CM: I73.9  ICD-9-CM: 924.1         Systolic CHF, acute on chronic (HCC) ICD-10-CM: I50.23  ICD-9-CM: 428.23, 428.0         Receiving inotropic medication ICD-10-CM: Z79.899  ICD-9-CM: V49.89         CHF (congestive heart failure) (HCC) ICD-10-CM: I50.9  ICD-9-CM: 428.0         CKD stage 3 due to type 2 diabetes mellitus ([facility] ICD-10-CM: E11.22, N18.30  ICD-9-CM: 250.40, 585.3         S/P CABG x 3 ICD-10-CM: Z95.1  ICD-9-CM: V45.81     Overview Signed 7/9/2018 11:20 AM by SCAR Nuñez     Coronary Artery Bypass Grafting x 3, LIMA to LAD, RSVG to OM, RSVG to RCA  Right GSV EVH             Coronary artery disease involving native coronary artery of native heart without angina pectoris ICD-10-CM: I25.10  ICD-9-CM: 414.01         Hypertension, essential ICD-10-CM: I10  ICD-9-CM: 401.9         Colon cancer screening ICD-10-CM: Z12.11  ICD-9-CM: V76.51         Nonrheumatic aortic valve stenosis ICD-10-CM: I35.0  ICD-9-CM: 424.1         Bruit of right carotid artery ICD-10-CM: R09.89  ICD-9-CM: 785. 9         Type 2 diabetes mellitus with hyperglycemia (HCC) ICD-10-CM: E11.65  ICD-9-CM: 250.00         Deafness ICD-10-CM: H91.90  ICD-9-CM: 389.9         Cramp of limb ICD-10-CM: R25.2  ICD-9-CM: 729.82            Past Medical History:      has a past medical history of CAD (coronary artery disease) (11/10/2016), Deafness (10/28/2012), DM (diabetes mellitus) ([facility] [address] Elevated cholesterol, Hypertension, and NSTEMI (non-ST elevated myocardial infarction) ([facility] [address] (11/10/2016). Past Surgical History:      has a past surgical history that includes hx appendectomy; pr unlisted procedure cardiac surgery (11/11/2016); colonoscopy (N/A, 6/28/2018); and colonoscopy (N/A, 1/18/2023). Home Medications:     Prior to Admission medications    Medication Sig Start Date End Date Taking? Authorizing Provider   polyethylene glycol (Miralax) 17 gram/dose powder Take 17 g by mouth daily as needed for Constipation. Yes Provider, Historical   furosemide (LASIX) 20 mg tablet Take 1 Tablet by mouth daily as needed (for weight greater than 120 lb by 2-3 lb or shortness of breath). 1/23/23  Yes Perlmutter, Marciano Dandy B, NP   glipiZIDE (GLUCOTROL) 5 mg tablet Take 1 tablet by mouth twice daily 12/2/22  Yes Hollie Morrow MD   ipratropium (ATROVENT) 21 mcg (0.03 %) nasal spray 2 Sprays by Both Nostrils route every twelve (12) hours.  11/21/22  Yes Hollie Morrow MD   ergocalciferol (ERGOCALCIFEROL) 1,250 mcg (50,000 unit) capsule Take 1 Capsule by mouth every seven (7) days. Patient taking differently: Take 50,000 Units by mouth every seven (7) days. Mondays 10/14/22  Yes Mary Ames MD   Januvia 50 mg tablet Take 1 tablet by mouth once daily 22  Yes Mary Ames MD   rosuvastatin (CRESTOR) 20 mg tablet Take 1 Tablet by mouth nightly. 22  Yes Lashell Clark MD   aspirin delayed-release 81 mg tablet Take 81 mg by mouth daily. Yes Provider, Historical   zolpidem (AMBIEN) 5 mg tablet Take 1 Tablet by mouth nightly as needed for Sleep. Max Daily Amount: 5 mg. 23   Mary Ames MD   nitroglycerin (NITROSTAT) 0.4 mg SL tablet 1 Tablet by SubLINGual route every five (5) minutes as needed for Chest Pain. Up to 3 doses. 22   Jaquelin Polo MD       Allergies/Social/Family History: Allergies   Allergen Reactions    Ambien [Zolpidem] Other (comments)     Causes extreme confusion      Social History     Tobacco Use    Smoking status: Never     Passive exposure: Never    Smokeless tobacco: Never   Substance Use Topics    Alcohol use:  Yes     Alcohol/week: 2.0 standard drinks     Types: 1 Cans of beer, 1 Shots of liquor per week     Comment: rarely      Family History   Problem Relation Age of Onset    Heart Disease Father     Heart Attack Father     Hypertension Mother     Elevated Lipids Brother     Elevated Lipids Brother     No Known Problems Sister     Elevated Lipids Brother     No Known Problems Son     No Known Problems Daughter     Anesth Problems Neg Hx        Review of Systems:   Not able to obtain due to patient medical condition    Objective:   Vital Signs:  Visit Vitals  BP (!) 102/59   Pulse 85   Temp 99.3 °F (37.4 °C)   Resp 20   Ht 5' 6\" (1.676 m)   Wt 61.7 kg (136 lb 0.4 oz)   SpO2 100%   BMI 21.95 kg/m²    O2 Flow Rate (L/min): 20 l/min O2 Device: Tracheostomy, Ventilator Temp (24hrs), Av.9 °F (37.2 °C), Min:98.1 °F (36.7 °C), Max:99.5 °F (37.5 °C)    CVP (mmHg): 5 mmHg (03/24/23 0700)      Intake/Output:     Intake/Output Summary (Last 24 hours) at 3/24/2023 0915  Last data filed at 3/24/2023 0700  Gross per 24 hour   Intake 1595.51 ml   Output 1553.2 ml   Net 42.31 ml       Physical Exam:    General:  Awake, sleepy at times, chronically ill looking on mechanical ventilation and CRRT   Eyes:  Sclera anicteric. Pupils equally round and reactive to light. Mouth/Throat: Mucous membranes normal, oral pharynx clear   Neck: Line in place bilaterally   Lungs:   Coarse crepitation bilaterally   CV:  LVAD hum   Abdomen:   Soft, non-tender. bowel sounds normal. non-distended   Extremities: No cyanosis or edema   Neuro: Open eyes spontaneously, wave and smile, at times follow simple command mainly on the right side, did not appreciate movement on left arm, minimal movement on left leg       LABS AND  DATA: Personally reviewed  Recent Labs     03/24/23  0400 03/23/23  0435   WBC 9.9 10.1   HGB 8.1* 8.2*   HCT 26.3* 26.0*    198     Recent Labs     03/24/23  0400 03/23/23  0435   * 134*   K 5.0 4.8    103   CO2 25 27   BUN 29* 30*   CREA 1.46* 1.40*   * 134*   CA 11.0* 10.9*   MG 3.3* 3.2*   PHOS 3.3 3.5     Recent Labs     03/24/23  0400 03/23/23  0435   * 388*   TP 6.2* 6.5   ALB 3.3* 3.4*   GLOB 2.9 3.1   LPSE  --  >3,000*     Recent Labs     03/24/23  0400 03/23/23  0435   INR 1.4* 1.4*   PTP 13.9* 13.9*      No results for input(s): PHI, PCO2I, PO2I, FIO2I in the last 72 hours. No results for input(s): CPK, CKMB, TROIQ, BNPP in the last 72 hours.     Hemodynamics:   PAP: PAP Systolic: 44 (17/25/46 4370) CO: CO (l/min): 4.8 l/min (03/10/23 0900)   Wedge: PAOP (PCWP) (mmhg): 36 mmHg (02/03/23 1830) CI: CI (l/min/m2): 2.7 l/min/m2 (03/10/23 0900)   CVP:  CVP (mmHg): 5 mmHg (03/24/23 0700) SVR:       PVR:       Ventilator Settings:  Mode Rate Tidal Volume Pressure FiO2 PEEP   Assist control, Volume control   350 ml  15 cm H2O 40 % 5 cm H20     Peak airway pressure: 20 cm H2O    Minute ventilation: 8.4 l/min        MEDS: Reviewed    Chest X-Ray:  CXR Results  (Last 48 hours)                 03/23/23 0431  XR CHEST PORT Final result    Impression:  No definite change bilateral pleural effusions and bibasilar atelectasis. Narrative:  INDICATION: s/p VAD intubated, impella       EXAMINATION:  AP CHEST, PORTABLE       COMPARISON: March 21, 2023       FINDINGS: Single AP portable view of the chest demonstrates no change in   position of the lines and tubes. The cardiomediastinal silhouette is unchanged. Bibasilar atelectasis and pleural effusions without definite change. No   pneumothorax. Assessment and Plan:   -Cardiomyopathy status post HeartMate 3 LVAD as destination therapy on April 15, 2023:  -Acute kidney injury on chronic kidney disease requiring CRRT:  - Hepatic congestion with elevated bilirubin:  - Right SUPA ischemic stroke with left hemiparesis  -Acute on chronic hypoxic respiratory failure, status post tracheostomy:  - Pancreatitis:  - Critical illness myopathy:     NEUROLOGICAL: Acute encephalopathy, critical illness polyneuropathy   No acute changes in his neurological finding, left hemiparesis. On aspirin. Seems that his alertness is improving slowly  [facility] ppx -- hold 3/24  Gabapentin 200 bid forLE pain  Trazodone 25 QHS for sleep  Hearing Aids -- need to new battery! PULMONOLOGY: Acute proximal respiratory failure, status post tracheostomy 7 March 2023   -Protective ventilatory strategy  -Daily SBT  -Pulmonary hygiene     CARDIOVASCULAR: Ischemic and nonischemic cardiomyopathy, left ventricular ejection fraction 25 to 30%, status post LVAD 15 February 2023, status post Impella right side removed 1 March 2023, cardiorenal syndrome   -Appreciate heart failure and cardiothoracic surgery input and management.  Family meeting 3/24  -Continue aspirin  -Off pressors     GASTROINTESTINAL: Hepatic congestion, hyperbilirubinemia, possible cirrhosis, pancreatitis   -Continue enteral feeds  -Continue GI prophylaxis  -Total bilirubin continue to trend up, lipase remains elevated     RENAL/ELECTROLYTE/FLUIDS: Acute on chronic renal failure   -Continue hemodialysis with continuous renal replacement therapy, 0 factor  -Strict I's and O's  -Place electrolytes as indicated  -Nephrology following, I appreciate the recommendation and management     ENDOCRINE:   -Maintain blood glucose levels between 140-180   -Sliding scale insulin   HEMATOLOGY/ONCOLOGY: Acute on chronic anemia, gastric neuroendocrine tumor   Transfuse for hemoglobin below 7  Off heparin or other anticoagulation at this time  ID/MICRO:   Patient on meropenem at least since the 16th. Received multiple antibiotic regimen before that. DISPOSITION  Stay in ICU    CRITICAL CARE CONSULTANT NOTE  I had a face to face encounter with the patient, reviewed and interpreted patient data including clinical events, labs, images, vital signs, I/O's, and examined patient. I have discussed the case and the plan and management of the patient's care with the consulting services, the bedside nurses and the respiratory therapist.      NOTE OF PERSONAL INVOLVEMENT IN CARE   This patient has a high probability of imminent, clinically significant deterioration, which requires the highest level of preparedness to intervene urgently. I participated in the decision-making and personally managed or directed the management of the following life and organ supporting interventions that required my frequent assessment to treat or prevent imminent deterioration. I personally spent 30 minutes of critical care time. This is time spent at this critically ill patient's bedside actively involved in patient care as well as the coordination of care and discussions with the patient's family. This does not include any procedural time which has been billed separately.     Sven Erwin Fatou Antonio DO  Staff Intensivist  [facility]  3/24/2023       Update -- Family is electing for palliation, once all family members have said their goodbyes and [employer] ceremonies may take place. This will most likely be completed by 3/25 in late afternoon. We will make his comfort the primary priority in his care at this point, discontinuing all lab draws, and interventions that do not have this goal in mind. Consultant services (HF, CTS, Palliative, Ethics, CCM, Nephrology, GI), nursing staff, family have all been updated and agreeable to this plan. 0

## 2025-03-18 NOTE — ED PROVIDER NOTE - OBJECTIVE STATEMENT
89-year-old female presents to the emergency department reporting bright red blood per rectum.  Had an episode last night and then again this morning.  States that she is not bleeding continuously without having use the bathroom however only when she goes to urinate, she notices that blood is coming out from her rectum.  No abdominal pain.  No lightheadedness or dizziness.  No chest pain or shortness of breath.  Patient states this has happened to her in the past however after having an isolated episode 1 night, by the next morning it was resolved.  Past surgical abdominal history includes hysterectomy, history of pelvic floor dysfunction status post surgery by Dr. Estevez several years ago.  No fevers chills or other reported complaints.  Occasional baby aspirin.

## 2025-03-18 NOTE — ED PROVIDER NOTE - CLINICAL SUMMARY MEDICAL DECISION MAKING FREE TEXT BOX
89-year-old female presents to the emergency department reporting bright red blood per rectum.  Had an episode last night and then again this morning.  States that she is not bleeding continuously without having use the bathroom however only when she goes to urinate, she notices that blood is coming out from her rectum.  No abdominal pain.  No lightheadedness or dizziness.  No chest pain or shortness of breath.  Patient states this has happened to her in the past however after having an isolated episode 1 night, by the next morning it was resolved.  Past surgical abdominal history includes hysterectomy, history of pelvic floor dysfunction status post surgery by Dr. Estevez several years ago.  No fevers chills or other reported complaints.  Occasional baby aspirin.   Exam as stated. Plan for labs with CT angio abd and pelv. 89-year-old female presents to the emergency department reporting bright red blood per rectum.  Had an episode last night and then again this morning.  States that she is not bleeding continuously without having use the bathroom however only when she goes to urinate, she notices that blood is coming out from her rectum.  No abdominal pain.  No lightheadedness or dizziness.  No chest pain or shortness of breath.  Patient states this has happened to her in the past however after having an isolated episode 1 night, by the next morning it was resolved.  Past surgical abdominal history includes hysterectomy, history of pelvic floor dysfunction status post surgery by Dr. Estevez several years ago.  No fevers chills or other reported complaints.  Occasional baby aspirin.   Exam as stated. Plan for labs with CT angio abd and pelv.    Results reviewed. Active diverticular bleed identified on CT. D/W Dr Garrido, colorectal surg attending. [facility] GI consultation. D/W Dr Yoo GI attending and NP for GI, who will consult. Patient placed on cardiac monitor and 2nd IV line placed. Informed of results. D/W Attg Hospitalist Dr ULISES Kaur.

## 2025-03-18 NOTE — ED ADULT NURSE REASSESSMENT NOTE - NS ED NURSE REASSESS COMMENT FT1
2nd blood transfusion initiated with bedside verification by 2 RNs. Pt transported to ICU with blood transfusing. No adverse reaction at this time.

## 2025-03-18 NOTE — CONSULT NOTE ADULT - PROBLEM SELECTOR RECOMMENDATION 9
Imaging suggestive of sigmoid colon diverticular bleeding.  Patient developing hemodynamic instability acutely.   Plan for emergent sigmoidoscopy for hemostasis in ICU.  Keep NPO.  IV Fluids.  Transfuse PRBC. Goal to maintain Hb >8 g/dL. Serial CBC.  Hold aspirin, agree with platelet transfusion.  Surgery consultant (Dr. Rogers) updated by me. Surgical intervention vs. transfer for IR embolization if colonoscopic hemostasis attempt is unsuccessful.

## 2025-03-18 NOTE — RAPID RESPONSE TEAM SUMMARY - NSSITUATIONBACKGROUNDRRT_GEN_ALL_CORE
88 y/o F with PMH of CAD, arrythmia s/p ablation 2003, Hyperlipidemia, aortic aneurysm, aortic regurgitation, Hypertension, came in with c/o passing BRBPR.   Had another episode of passing blood, followed by unresponsiveness and hypotension.

## 2025-03-18 NOTE — ED ADULT NURSE NOTE - OBJECTIVE STATEMENT
Pt presents to ED from home for rectal bleeding. Pt states last night she noticed BRB in toilet which again happened this morning. She takes ASA daily. Denies pain.

## 2025-03-18 NOTE — ED ADULT NURSE REASSESSMENT NOTE - NS ED NURSE REASSESS COMMENT FT1
Pt with BRB with clots in bedpan. Additional peripheral IV placed. Pt on cardiac monitor. Pt moved closer to nurse's station. Primafit placed for urine collection.

## 2025-03-18 NOTE — GOALS OF CARE CONVERSATION - ADVANCED CARE PLANNING - CONVERSATION DETAILS
I met with the patient at bedside to review Advanced Directives and GOC. Pt. is A&Ox3. Here from home with rectal bleeding. She has hx. of HTN. PCP of record is Dr. Kurzweil. She confirmed her son Fede is her HCP.  Pt. had "MOLST PRIOR" in [place] and I was unable to find an old MOLST in the Patient Window of the EMR. I asked the patient about her old MOLST and DNR status. She explained that she had signed a DNR in the past but did not remember where this document was. She also did not want to sign a MOLST now, and wanted to think about GOC. Asked that I come back to her in the afternoon. I met with the patient at bedside to review Advanced Directives and GOC. Pt. is A&Ox3. Here from home with rectal bleeding. She has hx. of HTN. PCP of record is Dr. Kurzweil. She confirmed her son Fede is her HCP.  Pt. had "MOLST PRIOR" in [place] and I was unable to find an old MOLST in the Patient Window of the EMR. I asked the patient about her old MOLST and DNR status. She explained that she had signed a DNR in the past but did not remember where this document was. She also did not want to sign a MOLST now, and wanted to think about GOC. Asked that I come back to her in the afternoon. When I returned she had decided to sign a new MOLST: DNR/DNI/trial niv, send to hospital if medically necessary, limited medical intervention, no feeding tube, allow trial of IV fluids, and use of abx. She deferred decision about use of a feeding tube. MD made aware of new GOC/MOLST.  MOLST and copy placed in patient's chart. General (Pediatric)

## 2025-03-19 ENCOUNTER — TRANSCRIPTION ENCOUNTER (OUTPATIENT)
Age: 89
End: 2025-03-19

## 2025-03-19 LAB
ALBUMIN SERPL ELPH-MCNC: 3.2 G/DL — LOW (ref 3.3–5)
ALP SERPL-CCNC: 97 U/L — SIGNIFICANT CHANGE UP (ref 40–120)
ALT FLD-CCNC: 18 U/L — SIGNIFICANT CHANGE UP (ref 10–45)
ANION GAP SERPL CALC-SCNC: 11 MMOL/L — SIGNIFICANT CHANGE UP (ref 5–17)
AST SERPL-CCNC: 24 U/L — SIGNIFICANT CHANGE UP (ref 10–40)
BILIRUB SERPL-MCNC: 1.1 MG/DL — SIGNIFICANT CHANGE UP (ref 0.2–1.2)
BUN SERPL-MCNC: 13 MG/DL — SIGNIFICANT CHANGE UP (ref 7–23)
CALCIUM SERPL-MCNC: 8 MG/DL — LOW (ref 8.4–10.5)
CHLORIDE SERPL-SCNC: 113 MMOL/L — HIGH (ref 96–108)
CREAT SERPL-MCNC: 0.66 MG/DL — SIGNIFICANT CHANGE UP (ref 0.5–1.3)
EGFR: 84 ML/MIN/1.73M2 — SIGNIFICANT CHANGE UP
EGFR: 84 ML/MIN/1.73M2 — SIGNIFICANT CHANGE UP
GLUCOSE SERPL-MCNC: 106 MG/DL — HIGH (ref 70–99)
HCT VFR BLD CALC: 33.9 % — LOW (ref 34.5–45)
HCT VFR BLD CALC: 39.2 % — SIGNIFICANT CHANGE UP (ref 34.5–45)
HGB BLD-MCNC: 11.4 G/DL — LOW (ref 11.5–15.5)
HGB BLD-MCNC: 12.9 G/DL — SIGNIFICANT CHANGE UP (ref 11.5–15.5)
MAGNESIUM SERPL-MCNC: 2.4 MG/DL — SIGNIFICANT CHANGE UP (ref 1.6–2.6)
MCHC RBC-ENTMCNC: 28.5 PG — SIGNIFICANT CHANGE UP (ref 27–34)
MCHC RBC-ENTMCNC: 28.9 PG — SIGNIFICANT CHANGE UP (ref 27–34)
MCHC RBC-ENTMCNC: 33.6 G/DL — SIGNIFICANT CHANGE UP (ref 32–36)
MCV RBC AUTO: 85.8 FL — SIGNIFICANT CHANGE UP (ref 80–100)
MCV RBC AUTO: 86.7 FL — SIGNIFICANT CHANGE UP (ref 80–100)
MRSA PCR RESULT.: SIGNIFICANT CHANGE UP
NRBC BLD AUTO-RTO: 0 /100 WBCS — SIGNIFICANT CHANGE UP (ref 0–0)
NRBC BLD AUTO-RTO: 0 /100 WBCS — SIGNIFICANT CHANGE UP (ref 0–0)
PLATELET # BLD AUTO: 274 K/UL — SIGNIFICANT CHANGE UP (ref 150–400)
PLATELET # BLD AUTO: 297 K/UL — SIGNIFICANT CHANGE UP (ref 150–400)
POTASSIUM SERPL-MCNC: 4.5 MMOL/L — SIGNIFICANT CHANGE UP (ref 3.5–5.3)
POTASSIUM SERPL-SCNC: 4.5 MMOL/L — SIGNIFICANT CHANGE UP (ref 3.5–5.3)
PROT SERPL-MCNC: 6.5 G/DL — SIGNIFICANT CHANGE UP (ref 6–8.3)
RBC # BLD: 3.95 M/UL — SIGNIFICANT CHANGE UP (ref 3.8–5.2)
RBC # BLD: 4.52 M/UL — SIGNIFICANT CHANGE UP (ref 3.8–5.2)
RBC # FLD: 15.8 % — HIGH (ref 10.3–14.5)
RBC # FLD: 16 % — HIGH (ref 10.3–14.5)
S AUREUS DNA NOSE QL NAA+PROBE: SIGNIFICANT CHANGE UP
SODIUM SERPL-SCNC: 145 MMOL/L — SIGNIFICANT CHANGE UP (ref 135–145)
WBC # BLD: 10.37 K/UL — SIGNIFICANT CHANGE UP (ref 3.8–10.5)
WBC # BLD: 9.59 K/UL — SIGNIFICANT CHANGE UP (ref 3.8–10.5)
WBC # FLD AUTO: 10.37 K/UL — SIGNIFICANT CHANGE UP (ref 3.8–10.5)
WBC # FLD AUTO: 9.59 K/UL — SIGNIFICANT CHANGE UP (ref 3.8–10.5)

## 2025-03-19 PROCEDURE — 99233 SBSQ HOSP IP/OBS HIGH 50: CPT | Mod: 25

## 2025-03-19 PROCEDURE — 45378 DIAGNOSTIC COLONOSCOPY: CPT | Mod: 59

## 2025-03-19 PROCEDURE — 99233 SBSQ HOSP IP/OBS HIGH 50: CPT

## 2025-03-19 RX ORDER — METOPROLOL SUCCINATE 50 MG/1
25 TABLET, EXTENDED RELEASE ORAL EVERY 8 HOURS
Refills: 0 | Status: DISCONTINUED | OUTPATIENT
Start: 2025-03-19 | End: 2025-03-19

## 2025-03-19 RX ORDER — NORTRIPTYLINE HCL 75 MG
1 CAPSULE ORAL
Refills: 0 | DISCHARGE

## 2025-03-19 RX ORDER — SODIUM CHLORIDE 9 G/1000ML
1000 INJECTION, SOLUTION INTRAVENOUS
Refills: 0 | Status: DISCONTINUED | OUTPATIENT
Start: 2025-03-19 | End: 2025-03-20

## 2025-03-19 RX ORDER — METOPROLOL SUCCINATE 50 MG/1
25 TABLET, EXTENDED RELEASE ORAL EVERY 8 HOURS
Refills: 0 | Status: DISCONTINUED | OUTPATIENT
Start: 2025-03-19 | End: 2025-03-21

## 2025-03-19 RX ADMIN — Medication 40 MILLIEQUIVALENT(S): at 00:54

## 2025-03-19 RX ADMIN — Medication 1 APPLICATION(S): at 05:33

## 2025-03-19 RX ADMIN — METOPROLOL SUCCINATE 25 MILLIGRAM(S): 50 TABLET, EXTENDED RELEASE ORAL at 18:19

## 2025-03-19 RX ADMIN — POLYETHYLENE GLYCOL-3350 AND ELECTROLYTES 1000 MILLILITER(S): 236; 6.74; 5.86; 2.97; 22.74 POWDER, FOR SOLUTION ORAL at 04:23

## 2025-03-19 RX ADMIN — Medication 40 MILLIGRAM(S): at 15:38

## 2025-03-19 RX ADMIN — Medication 3 MILLIGRAM(S): at 23:58

## 2025-03-19 NOTE — DIETITIAN INITIAL EVALUATION ADULT - ORAL INTAKE PTA/DIET HISTORY
As per patient she consumes ~ 2 meals a day , Lactose intolerance noted requiring Lactaid milk however does consume diary products

## 2025-03-19 NOTE — DIETITIAN INITIAL EVALUATION ADULT - PERTINENT MEDS FT
MEDICATIONS  (STANDING):  chlorhexidine 2% Cloths 1 Application(s) Topical <User Schedule>  dextrose 5% + lactated ringers. 1000 milliLiter(s) (50 mL/Hr) IV Continuous <Continuous>  pantoprazole  Injectable 40 milliGRAM(s) IV Push daily    MEDICATIONS  (PRN):  acetaminophen     Tablet .. 650 milliGRAM(s) Oral every 6 hours PRN Temp greater or equal to 38C (100.4F), Mild Pain (1 - 3)  aluminum hydroxide/magnesium hydroxide/simethicone Suspension 30 milliLiter(s) Oral every 4 hours PRN Dyspepsia  melatonin 3 milliGRAM(s) Oral at bedtime PRN Insomnia  ondansetron Injectable 4 milliGRAM(s) IV Push every 8 hours PRN Nausea and/or Vomiting

## 2025-03-19 NOTE — DIETITIAN INITIAL EVALUATION ADULT - ETIOLOGY
related to decreased appetite/ suboptimal nutrient intake  in the setting of loss of spouse x 1 year

## 2025-03-19 NOTE — DIETITIAN INITIAL EVALUATION ADULT - OTHER INFO
89 y/o F with PMH of CAD, arrythmia s/p ablation 2003, Hyperlipidemia, aortic aneurysm, aortic regurgitation, Hypertension, admitted for GI bleed , D5% LR @ 50ml/hr infusing , no edema , (+) BM(3/18) skin intact , rash on led & back . no significant weight change reported , unknown body weight , IV Protonix & Zofran noted . Patient scheduled for colonoscopy today ,

## 2025-03-19 NOTE — PROGRESS NOTE ADULT - ASSESSMENT
89 year old woman with blood per rectum and imaging indicating sigmoid diverticular bleeding, currently being transferred to ICU for hemodynamic instability.    Currently patient noted to be hemodynamically stable   Plan for colonoscopy 3/19  Completed full prep

## 2025-03-19 NOTE — PATIENT PROFILE ADULT - NSPROSPHOSPCHAPLAINYN_GEN_A_NUR
Continued Stay Note  [facility]     Patient Name: Vanessa Root  MRN: [id number]  Today's Date: 5/30/2023    Admit Date: 5/24/2023    Plan: SN[facility] referrals pending [facility]   Discharge Plan     Row Name 05/30/23 1458       Plan    Plan SNF referrals pending to [facility]    Patient/Family in Agreement with Plan yes    Plan Comments CCP met with patient at bedside to discuss discharge planning. Patient states at this time she hopes to discharge to SNF. CCP delivered SNF list to patient at bedside and she requests referrals to [facility].  [facility] sending referral for furher review and message left with [facility] requesting review. PT consult pending. Patient will need transport and wound vac arranged. Nayeli LANDIS RN/CCP               Discharge Codes    No documentation.               Expected Discharge Date and Time     Expected Discharge Date Expected Discharge Time    May 30, 2023             [facility]     no

## 2025-03-19 NOTE — H&P ADULT - HIV OFFER
20g PIV established RAC: blood drawn & sent.    Medicated per MAR.  Bed in lowest position.  Call light within reach.  Patient denies any needs at this time.   Opt out

## 2025-03-19 NOTE — PROGRESS NOTE ADULT - ASSESSMENT
IMPRESSION: Probable sigmoid diverticular hemorrhage - stable    PLAN: Colonoscopy by GI           Begin PO fluids after colonoscopy           No indication for any surgery at this time

## 2025-03-19 NOTE — PROGRESS NOTE ADULT - NUTRITIONAL ASSESSMENT
This patient has been assessed with a concern for Malnutrition and has been determined to have a diagnosis/diagnoses of Severe protein-calorie malnutrition.    This patient is being managed with:   Diet Clear Liquid-  Entered: Mar 19 2025  2:38PM

## 2025-03-19 NOTE — PROGRESS NOTE ADULT - ASSESSMENT
89 y/o F with PMH of CAD, arrythmia s/p ablation 2003, Hyperlipidemia, aortic aneurysm, aortic regurgitation, Hypertension, came into the [facility] on  03/18  complaining of bright red blood per rectum for 1 day. Pt is admitted for:     # Lower GI bleed -  sigmoid diverticular bleeding  - Hgb 9.9 > 12.9  - admitted to medical floor > RRT on 03/18 for hypotensive episode, pt had an episode of bright red blood per rectum. Hgb was 9.5, pt was given 2 units pRBC, 1 unit of platelets and upgraded to ICU. In the ICU pt underwent bedside sigmoidoscopy on 03/18, unable to visualize bleeding, colonoscopy 03/19 : some internal hemorrhoids w no active bleeding. As per GI, pt was started on clear liquids and downgraded to medical floor 03/19  - cw telemetry   - resume dvt ppx   - GI consult: maintain Hgb >8, serial CBC  - Surgery consult: no surgery indicated, transfuse as needed, if bleeding continues..may require IR consult if stable enough to transfer  -f/u CBC        # HTN  - cw metoprolol 25 mg tid      # CAD  - cw asa 81 mg qd    # DVT prophylaxis      Case discussed with Dr. Modi    88 y/o F with PMH of CAD, arrythmia s/p ablation 2003, Hyperlipidemia, aortic aneurysm, aortic regurgitation, Hypertension, came into the [facility] on  03/18  complaining of bright red blood per rectum for 1 day. Pt is admitted for:     # Lower GI bleed -  sigmoid diverticular bleeding  - Hgb 9.9 > 12.9  - 03/18 admitted to medical floor > RRT on 03/18 for hypotensive episode, pt had an episode of bright red blood per rectum. Hgb was 9.5, pt was given 2 units pRBC, 1 unit of platelets and upgraded to ICU. In the ICU pt underwent bedside sigmoidoscopy on 03/18, unable to visualize bleeding, colonoscopy 03/19 : some internal hemorrhoids w no active bleeding. As per GI, pt was started on clear liquids and tolerated. Pt is hemodynamically stable and downgraded to medical floor on 03/19  - cw telemetry   - GI consult: maintain Hgb >8, serial CBC  - Surgery consult: no surgery indicated, transfuse as needed, if bleeding continues..may require IR consult if stable enough to transfer  - hold asa 81 mg d/t diverticular bleeding   -f/u CBC    # HTN  - cw metoprolol 25 mg tid    # CAD  - hold asa 81 mg qd    #Diet   - Diet: pt tolerated liquid diet >advance to low fiber    # DVT prophylaxis  - SCDs    #GOC   - dnr/dni  with trial of NIV      Case discussed with Dr. Modi    89 y/o F with PMH of CAD, arrythmia s/p ablation 2003, Hyperlipidemia, aortic aneurysm, aortic regurgitation, Hypertension, came into the [facility] on  03/18  complaining of bright red blood per rectum for 1 day. Pt is admitted for:     # Lower GI bleed -  sigmoid diverticular bleeding  - Hgb 9.9 > 12.9  - 03/18 admitted to medical floor > RRT on 03/18 for hypotensive episode, pt had an episode of bright red blood per rectum. Hgb was 9.5, pt was given 2 units pRBC, 1 unit of platelets and upgraded to ICU. In the ICU pt underwent bedside sigmoidoscopy on 03/18, unable to visualize bleeding, colonoscopy 03/19 : some internal hemorrhoids w no active bleeding. As per GI, pt was started on clear liquids and tolerated. Pt is hemodynamically stable and downgraded to medical floor on 03/19  - cw telemetry   - GI consult: maintain Hgb >8, serial CBC  - Surgery consult: no surgery indicated, transfuse as needed, if bleeding continues, may require IR consult if stable enough to transfer  - hold asa 81 mg for now in setting of diverticular bleeding which likely has resolved, monitor closely for rebleeding as pt required transfusion    - f/u CBC    # HTN  - cw metoprolol 25 mg tid    # CAD  - hold asa 81 mg qd    #Diet   - Diet: pt tolerated liquid diet >advance to low fiber    # DVT prophylaxis  - SCDs    #GOC   - dnr/dni  with trial of NIV      Case discussed with Dr. Modi

## 2025-03-19 NOTE — PROGRESS NOTE ADULT - ASSESSMENT
89 y/o F with PMH htn, hld, a fib s/p ablation on asprin admitted for management of:      Acute blood loss anemia secondary to lower GIB  Plt dysfunction due to ASA use    underlying Hypertension, HLD (hyperlipidemia),     Plan  continue to trend CBC Q 6  remains HD stable  maintain MAP > 65  hold ASA  hold all AC  GI following  CTA with active sigmoid bleed, unable to visualize during bedside sigmoidoscopy yesterday  NPO for colonoscopy today  PPI  SCDs for dvt ppx           Disposition: ICU Care  Goals of Care: DNR/I       Patient seen and evaluated with Dr Watkins who agrees with above stated plan of care 88 y/o F with PMH htn, hld, a fib s/p ablation on asprin admitted for management of:      Acute blood loss anemia secondary to lower GIB  Plt dysfunction due to ASA use    underlying Hypertension, HLD (hyperlipidemia),     Plan  continue to trend CBC Q 6  remains HD stable  maintain MAP > 65  hold ASA  hold all AC  GI following  CTA with active sigmoid bleed, unable to visualize during bedside sigmoidoscopy yesterday  NPO for colonoscopy today  PPI  SCDs for dvt ppx          Disposition: ICU Care  Goals of Care: DNR/I       Call placed to richelle Mistry [phone] updated on plan of care   Patient seen and evaluated with Dr Watkins who agrees with above stated plan of care

## 2025-03-19 NOTE — PATIENT PROFILE ADULT - FALL HARM RISK - HARM RISK INTERVENTIONS

## 2025-03-19 NOTE — DIETITIAN INITIAL EVALUATION ADULT - PERTINENT LABORATORY DATA
03-19    145  |  113[H]  |  13  ----------------------------<  106[H]  4.5   |  21[L]  |  0.66    Ca    8.0[L]      19 Mar 2025 05:21  Phos  2.6     03-19  Mg     2.4     03-19    TPro  6.5  /  Alb  3.2[L]  /  TBili  1.1  /  DBili  x   /  AST  24  /  ALT  18  /  AlkPhos  97  03-19  POCT Blood Glucose.: 129 mg/dL (03-18-25 @ 12:49)  A1C with Estimated Average Glucose Result: 6.0 % (10-11-24 @ 11:40)

## 2025-03-20 LAB
ALBUMIN SERPL ELPH-MCNC: 2.7 G/DL — LOW (ref 3.3–5)
ALP SERPL-CCNC: 73 U/L — SIGNIFICANT CHANGE UP (ref 40–120)
ANION GAP SERPL CALC-SCNC: 8 MMOL/L — SIGNIFICANT CHANGE UP (ref 5–17)
AST SERPL-CCNC: 35 U/L — SIGNIFICANT CHANGE UP (ref 10–40)
BILIRUB SERPL-MCNC: 0.7 MG/DL — SIGNIFICANT CHANGE UP (ref 0.2–1.2)
BUN SERPL-MCNC: 19 MG/DL — SIGNIFICANT CHANGE UP (ref 7–23)
CALCIUM SERPL-MCNC: 7.6 MG/DL — LOW (ref 8.4–10.5)
CHLORIDE SERPL-SCNC: 111 MMOL/L — HIGH (ref 96–108)
CO2 SERPL-SCNC: 22 MMOL/L — SIGNIFICANT CHANGE UP (ref 22–31)
CREAT SERPL-MCNC: 0.68 MG/DL — SIGNIFICANT CHANGE UP (ref 0.5–1.3)
EGFR: 84 ML/MIN/1.73M2 — SIGNIFICANT CHANGE UP
EGFR: 84 ML/MIN/1.73M2 — SIGNIFICANT CHANGE UP
GLUCOSE SERPL-MCNC: 102 MG/DL — HIGH (ref 70–99)
HGB BLD-MCNC: 10.5 G/DL — LOW (ref 11.5–15.5)
MAGNESIUM SERPL-MCNC: 2.2 MG/DL — SIGNIFICANT CHANGE UP (ref 1.6–2.6)
MCHC RBC-ENTMCNC: 28.9 PG — SIGNIFICANT CHANGE UP (ref 27–34)
MCHC RBC-ENTMCNC: 33.3 G/DL — SIGNIFICANT CHANGE UP (ref 32–36)
MCV RBC AUTO: 86.8 FL — SIGNIFICANT CHANGE UP (ref 80–100)
NRBC BLD AUTO-RTO: 0 /100 WBCS — SIGNIFICANT CHANGE UP (ref 0–0)
PHOSPHATE SERPL-MCNC: 1.9 MG/DL — LOW (ref 2.5–4.5)
PLATELET # BLD AUTO: 211 K/UL — SIGNIFICANT CHANGE UP (ref 150–400)
POTASSIUM SERPL-MCNC: 4.1 MMOL/L — SIGNIFICANT CHANGE UP (ref 3.5–5.3)
POTASSIUM SERPL-SCNC: 4.1 MMOL/L — SIGNIFICANT CHANGE UP (ref 3.5–5.3)
PROT SERPL-MCNC: 5.7 G/DL — LOW (ref 6–8.3)
RBC # BLD: 3.63 M/UL — LOW (ref 3.8–5.2)
SODIUM SERPL-SCNC: 141 MMOL/L — SIGNIFICANT CHANGE UP (ref 135–145)
WBC # BLD: 8.44 K/UL — SIGNIFICANT CHANGE UP (ref 3.8–10.5)
WBC # FLD AUTO: 8.44 K/UL — SIGNIFICANT CHANGE UP (ref 3.8–10.5)

## 2025-03-20 PROCEDURE — 99233 SBSQ HOSP IP/OBS HIGH 50: CPT

## 2025-03-20 PROCEDURE — 99232 SBSQ HOSP IP/OBS MODERATE 35: CPT

## 2025-03-20 RX ORDER — ASPIRIN 325 MG
81 TABLET ORAL DAILY
Refills: 0 | Status: DISCONTINUED | OUTPATIENT
Start: 2025-03-20 | End: 2025-03-21

## 2025-03-20 RX ADMIN — Medication 40 MILLIGRAM(S): at 14:31

## 2025-03-20 RX ADMIN — METOPROLOL SUCCINATE 25 MILLIGRAM(S): 50 TABLET, EXTENDED RELEASE ORAL at 05:19

## 2025-03-20 RX ADMIN — METOPROLOL SUCCINATE 25 MILLIGRAM(S): 50 TABLET, EXTENDED RELEASE ORAL at 14:40

## 2025-03-20 RX ADMIN — Medication 3 MILLIGRAM(S): at 21:21

## 2025-03-20 RX ADMIN — METOPROLOL SUCCINATE 25 MILLIGRAM(S): 50 TABLET, EXTENDED RELEASE ORAL at 21:21

## 2025-03-20 RX ADMIN — Medication 81 MILLIGRAM(S): at 14:37

## 2025-03-20 NOTE — PROGRESS NOTE ADULT - ASSESSMENT
89 year old woman with blood per rectum and imaging indicating sigmoid diverticular bleeding, currently being transferred to ICU for hemodynamic instability.    Currently HH stable   No s/s of active bleed   s/p colonoscopy 3/19

## 2025-03-20 NOTE — PROGRESS NOTE ADULT - NUTRITIONAL ASSESSMENT
This patient has been assessed with a concern for Malnutrition and has been determined to have a diagnosis/diagnoses of Severe protein-calorie malnutrition.    This patient is being managed with:   Diet Low Fiber-  DASH/TLC {Sodium & Cholesterol Restricted}  Entered: Mar 19 2025  5:27PM

## 2025-03-20 NOTE — PROGRESS NOTE ADULT - PROBLEM SELECTOR PLAN 1
- Imaging suggest of sigmoid colon diverticular bleeding.  Patient developing hemodynamic instability acutely.   S/p emergent sigmoidoscopy on 3/18  s/p colonoscopy 3/19  Monitor for s/s of bleed  May restart aspirin.  Will sign off service
- Imaging suggestive of sigmoid colon diverticular bleeding.  Patient developing hemodynamic instability acutely.   S/p emergent sigmoidoscopy on 3/20   NPO for scheduled colonoscopy 3/20  IV Fluids.  S/p blood transfusion. Goal to maintain Hb >8 g/dL.   Serial CBC.  Hold aspirin, agree with platelet transfusion.  Surgery consultant (Dr. Rogers) updated by me.

## 2025-03-20 NOTE — PROGRESS NOTE ADULT - ASSESSMENT
88 y/o F with PMH of CAD, arrythmia s/p ablation 2003, Hyperlipidemia, aortic aneurysm, aortic regurgitation, Hypertension, came into the [facility] on  03/18  complaining of bright red blood per rectum for 1 day. Pt is admitted for:     # Lower GI bleed -  sigmoid diverticular bleeding  - Hgb 9.9 > 10.5  - 03/18 admitted to medical floor > RRT on 03/18 for hypotensive episode, pt had an episode of bright red blood per rectum. Hgb was 9.5, pt was given 2 units pRBC, 1 unit of platelets and upgraded to ICU. In the ICU pt underwent bedside sigmoidoscopy on 03/18, unable to visualize bleeding, colonoscopy 03/19 : some internal hemorrhoids w no active bleeding. As per GI, pt was started on clear liquids and tolerated. Pt is hemodynamically stable and downgraded to medical floor on 03/19  - cw telemetry   - GI consult: maintain Hgb >8, serial CBC  - Surgery consult: no surgery indicated, transfuse as needed, if bleeding continues, may require IR consult if stable enough to transfer  - hold asa 81 mg for now in setting of diverticular bleeding which likely has resolved, monitor closely for rebleeding as pt required transfusion    - f/u CBC    # HTN  - cw metoprolol 25 mg tid    # CAD  - hold asa 81 mg qd    #Diet   - Diet: pt tolerated liquid diet >advance to low fiber, tolerated>     # DVT prophylaxis  - SCDs    #GOC   - dnr/dni  with trial of NIV      Case discussed with Dr. Rolon

## 2025-03-20 NOTE — PROGRESS NOTE ADULT - ATTENDING COMMENTS
agree with above  downgraded from ICU on 3/19  GI bleed  h/h overall stable  continue to monitor  f/u further GI recs  continue with PO PPI

## 2025-03-20 NOTE — PROGRESS NOTE ADULT - ASSESSMENT
Physical Examination:  GENERAL:               Alert, Oriented, No acute distress.    HEENT:                    Pupils equal, reactive to light.  Symmetric. No JVD, Moist MM  PULM:                     Bilateral air entry, Clear to auscultation bilaterally,   CVS:                         S1, S2,  No Murmur  ABD:                        Soft, nondistended, nontender, normoactive bowel sounds,   EXT:                         No edema, nontender, No Cyanosis or Clubbing    NEURO:                  Alert, oriented, interactive, nonfocal, follows commands  PSYC:                      Calm, + Insight.       Assessment  89 y/o F with PMH htn, hld, a fib s/p ablation on asprin admitted for management of:    Problem lsit  Acute blood loss anemia secondary to lower GIB  Plt dysfunction due to ASA use    underlying Hypertension, HLD (hyperlipidemia),     Plan  Trend cbc daily  advance diet as tolerated  started on asa by primary team  GI f/u  continue care on medical floor  reconsult ICU as needed   Goals of Care: DNR/I

## 2025-03-21 ENCOUNTER — TRANSCRIPTION ENCOUNTER (OUTPATIENT)
Age: 89
End: 2025-03-21

## 2025-03-21 VITALS
HEART RATE: 73 BPM | SYSTOLIC BLOOD PRESSURE: 132 MMHG | DIASTOLIC BLOOD PRESSURE: 80 MMHG | OXYGEN SATURATION: 96 % | TEMPERATURE: 98 F | RESPIRATION RATE: 18 BRPM

## 2025-03-21 LAB
ALBUMIN SERPL ELPH-MCNC: 2.7 G/DL — LOW (ref 3.3–5)
ALP SERPL-CCNC: 74 U/L — SIGNIFICANT CHANGE UP (ref 40–120)
ALT FLD-CCNC: 18 U/L — SIGNIFICANT CHANGE UP (ref 10–45)
AST SERPL-CCNC: 27 U/L — SIGNIFICANT CHANGE UP (ref 10–40)
BILIRUB SERPL-MCNC: 0.6 MG/DL — SIGNIFICANT CHANGE UP (ref 0.2–1.2)
BUN SERPL-MCNC: 17 MG/DL — SIGNIFICANT CHANGE UP (ref 7–23)
CALCIUM SERPL-MCNC: 7.9 MG/DL — LOW (ref 8.4–10.5)
CHLORIDE SERPL-SCNC: 109 MMOL/L — HIGH (ref 96–108)
CO2 SERPL-SCNC: 23 MMOL/L — SIGNIFICANT CHANGE UP (ref 22–31)
CREAT SERPL-MCNC: 0.73 MG/DL — SIGNIFICANT CHANGE UP (ref 0.5–1.3)
EGFR: 79 ML/MIN/1.73M2 — SIGNIFICANT CHANGE UP
EGFR: 79 ML/MIN/1.73M2 — SIGNIFICANT CHANGE UP
GLUCOSE SERPL-MCNC: 106 MG/DL — HIGH (ref 70–99)
HCT VFR BLD CALC: 31.8 % — LOW (ref 34.5–45)
HGB BLD-MCNC: 11 G/DL — LOW (ref 11.5–15.5)
MCHC RBC-ENTMCNC: 29.8 PG — SIGNIFICANT CHANGE UP (ref 27–34)
MCHC RBC-ENTMCNC: 34.6 G/DL — SIGNIFICANT CHANGE UP (ref 32–36)
MCV RBC AUTO: 86.2 FL — SIGNIFICANT CHANGE UP (ref 80–100)
NRBC BLD AUTO-RTO: 0 /100 WBCS — SIGNIFICANT CHANGE UP (ref 0–0)
PLATELET # BLD AUTO: 274 K/UL — SIGNIFICANT CHANGE UP (ref 150–400)
POTASSIUM SERPL-MCNC: 3.6 MMOL/L — SIGNIFICANT CHANGE UP (ref 3.5–5.3)
POTASSIUM SERPL-SCNC: 3.6 MMOL/L — SIGNIFICANT CHANGE UP (ref 3.5–5.3)
PROT SERPL-MCNC: 5.6 G/DL — LOW (ref 6–8.3)
RBC # BLD: 3.69 M/UL — LOW (ref 3.8–5.2)
RBC # FLD: 16 % — HIGH (ref 10.3–14.5)
SODIUM SERPL-SCNC: 139 MMOL/L — SIGNIFICANT CHANGE UP (ref 135–145)
WBC # BLD: 6.31 K/UL — SIGNIFICANT CHANGE UP (ref 3.8–10.5)
WBC # FLD AUTO: 6.31 K/UL — SIGNIFICANT CHANGE UP (ref 3.8–10.5)

## 2025-03-21 PROCEDURE — 83690 ASSAY OF LIPASE: CPT

## 2025-03-21 PROCEDURE — P9100: CPT

## 2025-03-21 PROCEDURE — 36415 COLL VENOUS BLD VENIPUNCTURE: CPT

## 2025-03-21 PROCEDURE — 86901 BLOOD TYPING SEROLOGIC RH(D): CPT

## 2025-03-21 PROCEDURE — 86900 BLOOD TYPING SEROLOGIC ABO: CPT

## 2025-03-21 PROCEDURE — 85610 PROTHROMBIN TIME: CPT

## 2025-03-21 PROCEDURE — 85730 THROMBOPLASTIN TIME PARTIAL: CPT

## 2025-03-21 PROCEDURE — 99239 HOSP IP/OBS DSCHRG MGMT >30: CPT

## 2025-03-21 PROCEDURE — 93005 ELECTROCARDIOGRAM TRACING: CPT

## 2025-03-21 PROCEDURE — 85027 COMPLETE CBC AUTOMATED: CPT

## 2025-03-21 PROCEDURE — 86850 RBC ANTIBODY SCREEN: CPT

## 2025-03-21 PROCEDURE — P9016: CPT

## 2025-03-21 PROCEDURE — 99285 EMERGENCY DEPT VISIT HI MDM: CPT

## 2025-03-21 PROCEDURE — 36430 TRANSFUSION BLD/BLD COMPNT: CPT

## 2025-03-21 PROCEDURE — 86923 COMPATIBILITY TEST ELECTRIC: CPT

## 2025-03-21 PROCEDURE — 80048 BASIC METABOLIC PNL TOTAL CA: CPT

## 2025-03-21 PROCEDURE — 84100 ASSAY OF PHOSPHORUS: CPT

## 2025-03-21 PROCEDURE — 82962 GLUCOSE BLOOD TEST: CPT

## 2025-03-21 PROCEDURE — 80053 COMPREHEN METABOLIC PANEL: CPT

## 2025-03-21 PROCEDURE — 85025 COMPLETE CBC W/AUTO DIFF WBC: CPT

## 2025-03-21 PROCEDURE — 83735 ASSAY OF MAGNESIUM: CPT

## 2025-03-21 PROCEDURE — 87641 MR-STAPH DNA AMP PROBE: CPT

## 2025-03-21 PROCEDURE — 87640 STAPH A DNA AMP PROBE: CPT

## 2025-03-21 PROCEDURE — 74174 CTA ABD&PLVS W/CONTRAST: CPT | Mod: MC

## 2025-03-21 PROCEDURE — P9037: CPT

## 2025-03-21 RX ORDER — B1/B2/B3/B5/B6/B12/VIT C/FOLIC 500-0.5 MG
1 TABLET ORAL DAILY
Refills: 0 | Status: DISCONTINUED | OUTPATIENT
Start: 2025-03-21 | End: 2025-03-21

## 2025-03-21 RX ORDER — ASPIRIN 325 MG
1 TABLET ORAL
Qty: 0 | Refills: 0 | DISCHARGE

## 2025-03-21 RX ORDER — SENNA 187 MG
2 TABLET ORAL ONCE
Refills: 0 | Status: COMPLETED | OUTPATIENT
Start: 2025-03-21 | End: 2025-03-21

## 2025-03-21 RX ADMIN — Medication 1 APPLICATION(S): at 06:38

## 2025-03-21 RX ADMIN — METOPROLOL SUCCINATE 25 MILLIGRAM(S): 50 TABLET, EXTENDED RELEASE ORAL at 14:19

## 2025-03-21 RX ADMIN — Medication 81 MILLIGRAM(S): at 12:37

## 2025-03-21 RX ADMIN — METOPROLOL SUCCINATE 25 MILLIGRAM(S): 50 TABLET, EXTENDED RELEASE ORAL at 06:37

## 2025-03-21 RX ADMIN — Medication 2 TABLET(S): at 10:53

## 2025-03-21 RX ADMIN — Medication 40 MILLIGRAM(S): at 06:37

## 2025-03-21 NOTE — PROGRESS NOTE ADULT - SUBJECTIVE AND OBJECTIVE BOX
The patient appears and feels well. She has not passed any stool or blood for the past 24 hours, and the H/H has been stable. The patient is tolerating a low fiober diet and denies any abdominal pain.    PFSH: All noncontributory    MEDICATIONS REVIEWED:acetaminophen     Tablet .. 650 milliGRAM(s) Oral every 6 hours PRN  aluminum hydroxide/magnesium hydroxide/simethicone Suspension 30 milliLiter(s) Oral every 4 hours PRN  aspirin  chewable 81 milliGRAM(s) Oral daily  chlorhexidine 2% Cloths 1 Application(s) Topical <User Schedule>  melatonin 3 milliGRAM(s) Oral at bedtime PRN  metoprolol tartrate 25 milliGRAM(s) Oral every 8 hours  ondansetron Injectable 4 milliGRAM(s) IV Push every 8 hours PRN  pantoprazole    Tablet 40 milliGRAM(s) Oral before breakfast      ALLERGIES:sulfa drugs (Unknown)  statins (Unknown)  Originally Entered as [Unknown] reaction to [SULFUR] (Unknown)  Macrodantin (Unknown)      REVIEW OF SYSTEMS:  Comprehensive Review of Systems negative except as noted in HPI      PHYSICAL EXAMINATION:  RESPIRATORY: Clear to auscultation bilaterally, respirations non-labored.  CARDIAC: RR, normal S1S2, no murmurs.  ABDOMEN: soft, BS present, no masses, no hernias, no peritoneal signs, no KLS, nontender.  VASCULAR: Equal and normal pulses.  MUSCULOSKELETAL: Full ROM, no deformities.      T(C): 36.7 (03-21-25 @ 05:31), Max: 36.8 (03-20-25 @ 11:18)  HR: 66 (03-21-25 @ 05:31) (66 - 83)  BP: 137/73 (03-21-25 @ 05:31) (117/83 - 137/73)  RR: 18 (03-21-25 @ 05:31) (18 - 18)  SpO2: 97% (03-21-25 @ 05:31) (95% - 97%)                          11.0   6.31  )-----------( 274      ( 21 Mar 2025 07:00 )             31.8       03-21    139  |  109[H]  |  17  ----------------------------<  106[H]  3.6   |  23  |  0.73    Ca    7.9[L]      21 Mar 2025 07:00  Phos  1.9     03-20  Mg     2.2     03-20    TPro  5.6[L]  /  Alb  2.7[L]  /  TBili  0.6  /  DBili  x   /  AST  27  /  ALT  18  /  AlkPhos  74  03-21      
Follow-up Critical Care Progress Note  Chief Complaint : Hemorrhage of rectum and anus          No new events overnight.  Denies SOB/CP.   no more bleeding noted  no bm since yesterday  comfortable.       Allergies :sulfa drugs (Unknown)  statins (Unknown)  Originally Entered as [Unknown] reaction to [SULFUR] (Unknown)  Macrodantin (Unknown)      PAST MEDICAL & SURGICAL HISTORY:  Hypertension    Arrhythmia  s/p ablation-2003    HLD (hyperlipidemia)    UTI (urinary tract infection)  chronic    S/P hysterectomy    S/P cataract surgery  bilateral        Medications:  MEDICATIONS  (STANDING):  aspirin  chewable 81 milliGRAM(s) Oral daily  chlorhexidine 2% Cloths 1 Application(s) Topical <User Schedule>  metoprolol tartrate 25 milliGRAM(s) Oral every 8 hours  pantoprazole    Tablet 40 milliGRAM(s) Oral before breakfast    MEDICATIONS  (PRN):  acetaminophen     Tablet .. 650 milliGRAM(s) Oral every 6 hours PRN Temp greater or equal to 38C (100.4F), Mild Pain (1 - 3)  aluminum hydroxide/magnesium hydroxide/simethicone Suspension 30 milliLiter(s) Oral every 4 hours PRN Dyspepsia  melatonin 3 milliGRAM(s) Oral at bedtime PRN Insomnia  ondansetron Injectable 4 milliGRAM(s) IV Push every 8 hours PRN Nausea and/or Vomiting      Antibiotics History      Heme Medications   aspirin  chewable 81 milliGRAM(s) Oral daily, 03-20-25 @ 13:56      GI Medications  aluminum hydroxide/magnesium hydroxide/simethicone Suspension 30 milliLiter(s) Oral every 4 hours, 03-18-25 @ 11:05, Routine PRN  pantoprazole    Tablet 40 milliGRAM(s) Oral before breakfast, 03-20-25 @ 10:56, STAT      COVID      COVID Biomarkers            Trend Cardiac Enzymes    Trend BNP    Procalcitonin Trend    WBC Trend  03-20-25 @ 06:04   -  8.44  03-19-25 @ 17:20   -  9.59  03-19-25 @ 05:21   -  10.37  03-18-25 @ 21:20   -  9.64  03-18-25 @ 18:25   -  8.94  03-18-25 @ 12:40   -  6.34    H/H Trend  03-20-25 @ 06:04   -   10.5[L]/ 31.5[L]  03-19-25 @ 17:20   -   11.4[L]/ 33.9[L]  03-19-25 @ 05:21   -   12.9/ 39.2  03-18-25 @ 21:20   -   13.0/ 38.9  03-18-25 @ 18:25   -   12.8/ 38.3  03-18-25 @ 12:40   -   9.5[L]/ 30.1[L]    Stool Occult Blood    Platelet Trend  03-20-25 @ 06:04   -  211  03-19-25 @ 17:20   -  274  03-19-25 @ 05:21   -  297  03-18-25 @ 21:20   -  265  03-18-25 @ 18:25   -  286  03-18-25 @ 12:40   -  264    Trend Sodium  03-20-25 @ 06:04   -  141  03-19-25 @ 05:21   -  145  03-18-25 @ 20:18   -  140  03-18-25 @ 08:35   -  142    Trend Potassium  03-20-25 @ 06:04   -  4.1  03-19-25 @ 05:21   -  4.5  03-18-25 @ 20:18   -  3.4[L]  03-18-25 @ 08:35   -  3.9    Trend Bun/Cr  03-20-25 @ 06:04  BUN/CR -  19 / 0.68  03-19-25 @ 05:21  BUN/CR -  13 / 0.66  03-18-25 @ 20:18  BUN/CR -  13 / 0.53  03-18-25 @ 08:35  BUN/CR -  16 / 0.76    Lactic Acid Trend    ABG Trend    Trend AST/ALT/ALK Phos/Bili  03-20-25 @ 06:04   35/17/73/0.7  03-19-25 @ 05:21   24/18/97/1.1  03-18-25 @ 08:35   22/21/98/0.6  10-11-24 @ 11:40   28/18/94/0.6  06-26-24 @ 06:57   27/28/107/1.0  06-25-24 @ 19:05   28/32/114/0.4      Ammonia Trend      Amylase / Lipase Trend  03-18-25 @ 08:35   -   -- / 23      Albumin Trend  03-20-25 @ 06:04   -   2.7[L]  03-19-25 @ 05:21   -   3.2[L]  03-18-25 @ 08:35   -   3.4  10-11-24 @ 11:40   -   3.4  06-26-24 @ 06:57   -   3.5  06-25-24 @ 19:05   -   3.7      PTT - PT - INR Trend  03-18-25 @ 08:35   -   30.5 - 12.8 - 1.09  10-11-24 @ 11:40   -   30.4 - 12.6 - 1.07  06-26-24 @ 06:57   -   33.0 - 12.3 - 1.05    Glucose Trend  03-20-25 @ 06:04   -  102[H] -- --  03-19-25 @ 05:21   -  106[H] -- --  03-18-25 @ 20:18   -  99 -- --  03-18-25 @ 12:49   -  -- -- 129[H]  03-18-25 @ 08:35   -  113[H] -- --    A1C with Estimated Average Glucose Result: 6.0 % *H* [4.0 - 5.6] (10-11-24 @ 11:40)      LABS:                        10.5   8.44  )-----------( 211      ( 20 Mar 2025 06:04 )             31.5     03-20    141  |  111[H]  |  19  ----------------------------<  102[H]  4.1   |  22  |  0.68    Ca    7.6[L]      20 Mar 2025 06:04  Phos  1.9     03-20  Mg     2.2     03-20    TPro  5.7[L]  /  Alb  2.7[L]  /  TBili  0.7  /  DBili  x   /  AST  35  /  ALT  17  /  AlkPhos  73  03-20             VITALS:  T(C): 36.8 (03-20-25 @ 11:18), Max: 36.8 (03-19-25 @ 16:54)  T(F): 98.2 (03-20-25 @ 11:18), Max: 98.2 (03-19-25 @ 16:54)  HR: 83 (03-20-25 @ 14:40) (70 - 90)  BP: 117/83 (03-20-25 @ 14:40) (117/83 - 171/80)  BP(mean): 94 (03-20-25 @ 14:40) (86 - 94)  ABP: --  ABP(mean): --  RR: 18 (03-20-25 @ 11:18) (16 - 18)  SpO2: 97% (03-20-25 @ 11:18) (96% - 98%)  CVP(mm Hg): --  CVP(cm H2O): --    Ins and Outs       Height (cm): 162.6 (03-19-25 @ 11:44)  Weight (kg): 54.4 (03-19-25 @ 11:44)  BMI (kg/m2): 20.6 (03-19-25 @ 11:44)     
Follow-up Critical Care Progress Note  Chief Complaint : Hemorrhage of rectum and anus      Hemodynamically stable overnight   Tolerating bowel prep  Still with some bloody stool  hgb stable now 12.9/39.2   s/p 2 units PRBC and 1 PLT yesterday         Allergies :sulfa drugs (Unknown)  statins (Unknown)  Originally Entered as [Unknown] reaction to [SULFUR] (Unknown)  Macrodantin (Unknown)      PAST MEDICAL & SURGICAL HISTORY:  Hypertension  Arrhythmia s/p ablation-2003  HLD (hyperlipidemia)  UTI (urinary tract infection) chronic  S/P hysterectomy  S/P cataract surgery bilateral      Medications:  MEDICATIONS  (STANDING):  chlorhexidine 2% Cloths 1 Application(s) Topical <User Schedule>  dextrose 5% + lactated ringers. 1000 milliLiter(s) (50 mL/Hr) IV Continuous <Continuous>  pantoprazole  Injectable 40 milliGRAM(s) IV Push daily    MEDICATIONS  (PRN):  acetaminophen     Tablet .. 650 milliGRAM(s) Oral every 6 hours PRN Temp greater or equal to 38C (100.4F), Mild Pain (1 - 3)  aluminum hydroxide/magnesium hydroxide/simethicone Suspension 30 milliLiter(s) Oral every 4 hours PRN Dyspepsia  melatonin 3 milliGRAM(s) Oral at bedtime PRN Insomnia  ondansetron Injectable 4 milliGRAM(s) IV Push every 8 hours PRN Nausea and/or Vomiting    GI Medications  aluminum hydroxide/magnesium hydroxide/simethicone Suspension 30 milliLiter(s) Oral every 4 hours, 03-18-25 @ 11:05, Routine PRN  pantoprazole  Injectable 40 milliGRAM(s) IV Push daily, 03-18-25 @ 13:47, Routine      WBC Trend  03-19-25 @ 05:21   -  10.37  03-18-25 @ 21:20   -  9.64  03-18-25 @ 18:25   -  8.94  03-18-25 @ 12:40   -  6.34  03-18-25 @ 10:35   -  7.41  03-18-25 @ 08:35   -  6.24    H/H Trend  03-19-25 @ 05:21   -   12.9/ 39.2  03-18-25 @ 21:20   -   13.0/ 38.9  03-18-25 @ 18:25   -   12.8/ 38.3  03-18-25 @ 12:40   -   9.5[L]/ 30.1[L]  03-18-25 @ 10:35   -   10.5[L]/ 33.2[L]  03-18-25 @ 08:35   -   11.3[L]/ 34.7    Platelet Trend  03-19-25 @ 05:21   -  297  03-18-25 @ 21:20   -  265  03-18-25 @ 18:25   -  286  03-18-25 @ 12:40   -  264  03-18-25 @ 10:35   -  286  03-18-25 @ 08:35   -  322    Trend Sodium  03-19-25 @ 05:21   -  145  03-18-25 @ 20:18   -  140  03-18-25 @ 08:35   -  142    Trend Potassium  03-19-25 @ 05:21   -  4.5  03-18-25 @ 20:18   -  3.4[L]  03-18-25 @ 08:35   -  3.9    Trend Bun/Cr  03-19-25 @ 05:21  BUN/CR -  13 / 0.66  03-18-25 @ 20:18  BUN/CR -  13 / 0.53  03-18-25 @ 08:35  BUN/CR -  16 / 0.76      Trend AST/ALT/ALK Phos/Bili  03-19-25 @ 05:21   24/18/97/1.1  03-18-25 @ 08:35   22/21/98/0.6  10-11-24 @ 11:40   28/18/94/0.6  06-26-24 @ 06:57   27/28/107/1.0  06-25-24 @ 19:05   28/32/114/0.4      Amylase / Lipase Trend  03-18-25 @ 08:35   -   -- / 23      Albumin Trend  03-19-25 @ 05:21   -   3.2[L]  03-18-25 @ 08:35   -   3.4  10-11-24 @ 11:40   -   3.4  06-26-24 @ 06:57   -   3.5  06-25-24 @ 19:05   -   3.7      PTT - PT - INR Trend  03-18-25 @ 08:35   -   30.5 - 12.8 - 1.09  10-11-24 @ 11:40   -   30.4 - 12.6 - 1.07  06-26-24 @ 06:57   -   33.0 - 12.3 - 1.05    Glucose Trend  03-19-25 @ 05:21   -  106[H] -- --  03-18-25 @ 20:18   -  99 -- --  03-18-25 @ 12:49   -  -- -- 129[H]  03-18-25 @ 08:35   -  113[H] -- --    A1C with Estimated Average Glucose Result: 6.0 % *H* [4.0 - 5.6] (10-11-24 @ 11:40)      LABS:                        12.9   10.37 )-----------( 297      ( 19 Mar 2025 05:21 )             39.2     03-19    145  |  113[H]  |  13  ----------------------------<  106[H]  4.5   |  21[L]  |  0.66    Ca    8.0[L]      19 Mar 2025 05:21  Phos  2.6     03-19  Mg     2.4     03-19    TPro  6.5  /  Alb  3.2[L]  /  TBili  1.1  /  DBili  x   /  AST  24  /  ALT  18  /  AlkPhos  97  03-19        PT/INR - ( 18 Mar 2025 08:35 )   PT: 12.8 sec;   INR: 1.09 ratio         PTT - ( 18 Mar 2025 08:35 )  PTT:30.5 sec  Urinalysis Basic - ( 19 Mar 2025 05:21 )    Color: x / Appearance: x / SG: x / pH: x  Gluc: 106 mg/dL / Ketone: x  / Bili: x / Urobili: x   Blood: x / Protein: x / Nitrite: x   Leuk Esterase: x / RBC: x / WBC x   Sq Epi: x / Non Sq Epi: x / Bacteria: x        POCT Blood Glucose.: 129 mg/dL (18 Mar 2025 12:49)      RADIOLOGY  CT: < from: CT Angio Abdomen and Pelvis w/ IV Cont (03.18.25 @ 09:56) >  IMPRESSION: Active sigmoid diverticular bleed. Results discussed with   Randa Nye at time of interpretation.      < end of copied text >          VITALS:  T(C): 36.4 (03-19-25 @ 04:00), Max: 36.8 (03-18-25 @ 20:00)  T(F): 97.6 (03-19-25 @ 04:00), Max: 98.2 (03-18-25 @ 20:00)  HR: 108 (03-19-25 @ 08:00) (70 - 112)  BP: 153/90 (03-19-25 @ 08:00) (85/64 - 188/86)  BP(mean): 102 (03-19-25 @ 08:00) (11 - 119)  RR: 22 (03-19-25 @ 08:00) (13 - 24)  SpO2: 98% (03-19-25 @ 08:00) (92% - 100%) on room air    Ins and Outs     03-18-25 @ 07:01  -  03-19-25 @ 07:00  --------------------------------------------------------  IN: 2826 mL / OUT: 1000 mL / NET: 1826 mL        Height (cm): 165.1 (03-18-25 @ 08:06)  Weight (kg): 54.4 (03-18-25 @ 08:06)  BMI (kg/m2): 20 (03-18-25 @ 08:06)        I&O's Detail    18 Mar 2025 07:01  -  19 Mar 2025 07:00  --------------------------------------------------------  IN:    IV PiggyBack: 100 mL    Oral Fluid: 2000 mL    Platelets - Single Donor: 226 mL    sodium chloride 0.9%: 500 mL  Total IN: 2826 mL    OUT:    Voided (mL): 1000 mL  Total OUT: 1000 mL    Total NET: 1826 mL          Physical Examination:  GENERAL:               Alert, Oriented, No acute distress.    HEENT:                    Pupils equal, reactive to light.  Symmetric. No JVD, Moist MM  PULM:                     Bilateral air entry, Clear to auscultation bilaterally,   CVS:                         S1, S2,  No Murmur  ABD:                        Soft, nondistended, nontender, normoactive bowel sounds,   EXT:                         No edema, nontender, No Cyanosis or Clubbing   Vascular:                Warm Extremities, Normal Capillary refill, Normal Distal Pulses  SKIN:                       Warm and well perfused, no rashes noted.   NEURO:                  Alert, oriented, interactive, nonfocal, follows commands  PSYC:                      Calm, + Insight.            
GI Follow up  Patient seen and examined at bedside. Denies abdominal pain, denies N/V/D, no hemetemesis, last bm liquid tinged red, NPO for GI procedure            MEDICATIONS  (STANDING):  chlorhexidine 2% Cloths 1 Application(s) Topical <User Schedule>  dextrose 5% + lactated ringers. 1000 milliLiter(s) (50 mL/Hr) IV Continuous <Continuous>  pantoprazole  Injectable 40 milliGRAM(s) IV Push daily    MEDICATIONS  (PRN):  acetaminophen     Tablet .. 650 milliGRAM(s) Oral every 6 hours PRN Temp greater or equal to 38C (100.4F), Mild Pain (1 - 3)  aluminum hydroxide/magnesium hydroxide/simethicone Suspension 30 milliLiter(s) Oral every 4 hours PRN Dyspepsia  melatonin 3 milliGRAM(s) Oral at bedtime PRN Insomnia  ondansetron Injectable 4 milliGRAM(s) IV Push every 8 hours PRN Nausea and/or Vomiting      Allergies    sulfa drugs (Unknown)  statins (Unknown)  Originally Entered as [Unknown] reaction to [SULFUR] (Unknown)  Macrodantin (Unknown)    Intolerances          Vital Signs Last 24 Hrs  T(C): 36.9 (19 Mar 2025 11:44), Max: 36.9 (19 Mar 2025 11:44)  T(F): 98.5 (19 Mar 2025 11:44), Max: 98.5 (19 Mar 2025 11:44)  HR: 86 (19 Mar 2025 11:44) (72 - 112)  BP: 168/76 (19 Mar 2025 11:44) (122/80 - 188/86)  BP(mean): 102 (19 Mar 2025 08:00) (11 - 116)  RR: 19 (19 Mar 2025 11:44) (13 - 24)  SpO2: 98% (19 Mar 2025 11:44) (92% - 100%)    Parameters below as of 19 Mar 2025 06:13  Patient On (Oxygen Delivery Method): room air        PHYSICAL EXAM:    Constitutional: Well-developed  ENTT: clear conjunctivae and sclera  Respiratory: clear to auscultation  Cardiovascular: S1 and S2  Gastrointestinal: +Bowel Sounds all quadrants, soft, Non tender, non distended  Extremities: No peripheral edema, neg clubbing, cyanosis  Neurological: A/O x 3  Skin: warm and dry      LABS:                        12.9   10.37 )-----------( 297      ( 19 Mar 2025 05:21 )             39.2     03-19    145  |  113[H]  |  13  ----------------------------<  106[H]  4.5   |  21[L]  |  0.66    Ca    8.0[L]      19 Mar 2025 05:21  Phos  2.6     03-19  Mg     2.4     03-19    TPro  6.5  /  Alb  3.2[L]  /  TBili  1.1  /  DBili  x   /  AST  24  /  ALT  18  /  AlkPhos  97  03-19    PT/INR - ( 18 Mar 2025 08:35 )   PT: 12.8 sec;   INR: 1.09 ratio         PTT - ( 18 Mar 2025 08:35 )  PTT:30.5 sec  Urinalysis Basic - ( 19 Mar 2025 05:21 )    Color: x / Appearance: x / SG: x / pH: x  Gluc: 106 mg/dL / Ketone: x  / Bili: x / Urobili: x   Blood: x / Protein: x / Nitrite: x   Leuk Esterase: x / RBC: x / WBC x   Sq Epi: x / Non Sq Epi: x / Bacteria: x      LIVER FUNCTIONS - ( 19 Mar 2025 05:21 )  Alb: 3.2 g/dL / Pro: 6.5 g/dL / ALK PHOS: 97 U/L / ALT: 18 U/L / AST: 24 U/L / GGT: x             RADIOLOGY & ADDITIONAL TESTS:  
Patient is a 89y old  Female who presents with a chief complaint of BRBPR (19 Mar 2025 13:30)      INTERVAL HPI/OVERNIGHT EVENTS: Patient seen and examined at bedside. No overnight events.    MEDICATIONS  (STANDING):  chlorhexidine 2% Cloths 1 Application(s) Topical <User Schedule>  dextrose 5% + lactated ringers. 1000 milliLiter(s) (50 mL/Hr) IV Continuous <Continuous>  pantoprazole  Injectable 40 milliGRAM(s) IV Push daily    MEDICATIONS  (PRN):  acetaminophen     Tablet .. 650 milliGRAM(s) Oral every 6 hours PRN Temp greater or equal to 38C (100.4F), Mild Pain (1 - 3)  aluminum hydroxide/magnesium hydroxide/simethicone Suspension 30 milliLiter(s) Oral every 4 hours PRN Dyspepsia  melatonin 3 milliGRAM(s) Oral at bedtime PRN Insomnia  ondansetron Injectable 4 milliGRAM(s) IV Push every 8 hours PRN Nausea and/or Vomiting      Allergies    sulfa drugs (Unknown)  statins (Unknown)  Originally Entered as [Unknown] reaction to [SULFUR] (Unknown)  Macrodantin (Unknown)    Intolerances        REVIEW OF SYSTEMS:  CONSTITUTIONAL: No fever or chills  HEENT:  No headache, no sore throat  RESPIRATORY: No cough, wheezing, or shortness of breath  CARDIOVASCULAR: No chest pain, palpitations  GASTROINTESTINAL: No abd pain, nausea, vomiting, or diarrhea  GENITOURINARY: No dysuria, frequency, or hematuria  NEUROLOGICAL: no focal weakness or dizziness  MUSCULOSKELETAL: no myalgias     Vital Signs Last 24 Hrs  T(C): 36.9 (19 Mar 2025 11:44), Max: 36.9 (19 Mar 2025 11:44)  T(F): 98.5 (19 Mar 2025 11:44), Max: 98.5 (19 Mar 2025 11:44)  HR: 87 (19 Mar 2025 15:00) (72 - 112)  BP: 168/76 (19 Mar 2025 11:44) (122/80 - 177/85)  BP(mean): 109 (19 Mar 2025 11:00) (11 - 116)  RR: 19 (19 Mar 2025 15:00) (17 - 25)  SpO2: 98% (19 Mar 2025 15:00) (92% - 100%)    Parameters below as of 19 Mar 2025 06:13  Patient On (Oxygen Delivery Method): room air      I&O's Summary    18 Mar 2025 07:01  -  19 Mar 2025 07:00  --------------------------------------------------------  IN: 2826 mL / OUT: 1000 mL / NET: 1826 mL      BMI (kg/m2): 20.6 (03-19-25 @ 11:44)    PHYSICAL EXAM:  GENERAL: NAD  HEENT:  AT/NC, moist mucous membranes, EOMI, conjunctiva and sclera clear  RESPIR:  CTA b/l, no rales, wheezes, or rhonchi,  normal respiratory effort, no intercostal retractions  HEART:  RRR, S1, S2, no murmurs; no pitting edema  ABDOMEN:  BS+, soft, nontender, nondistended; No HSM  MSK/EXTREMITIES: 2+ peripheral pulses, no clubbing or cyanosis  NERVOUS SYSTEM: answers questions and follows commands appropriately, A&Ox3 grossly moves all extremities   SKIN: warm, well perfused, approprate for age, no visible lesions   PSYCH: Appropriate affect, Alert & Awake; Good judgement      LABS: Personally reviewed  CBC                        12.9   10.37 )-----------( 297      ( 19 Mar 2025 05:21 )             39.2     CMP  03-19    145  |  113  |  13  ----------------------------<  106  4.5   |  21  |  0.66    Ca    8.0      19 Mar 2025 05:21  Phos  2.6     03-19  Mg     2.4     03-19    TPro  6.5  /  Alb  3.2  /  TBili  1.1  /  DBili  x   /  AST  24  /  ALT  18  /  AlkPhos  97  03-19      Lipase: 23 U/L (03-18-25 @ 08:35)      PT/INR - ( 18 Mar 2025 08:35 )   PT: 12.8 sec;   INR: 1.09 ratio         PTT - ( 18 Mar 2025 08:35 )  PTT:30.5 sec                            Urinalysis Basic - ( 19 Mar 2025 05:21 )    Color: x / Appearance: x / SG: x / pH: x  Gluc: 106 mg/dL / Ketone: x  / Bili: x / Urobili: x   Blood: x / Protein: x / Nitrite: x   Leuk Esterase: x / RBC: x / WBC x   Sq Epi: x / Non Sq Epi: x / Bacteria: x                RADIOLOGY & ADDITIONAL TESTS: Personally reviewed.       Consultant(s) Notes Reviewed:  [x] YES  [ ] NO   Discussed with DAMION/AMELIA, RN    
Patient is a 89y old  Female who presents with a chief complaint of BRBPR (20 Mar 2025 07:08)      INTERVAL HPI/OVERNIGHT EVENTS: Patient seen and examined at bedside. No overnight events.    MEDICATIONS  (STANDING):  chlorhexidine 2% Cloths 1 Application(s) Topical <User Schedule>  dextrose 5% + lactated ringers. 1000 milliLiter(s) (50 mL/Hr) IV Continuous <Continuous>  metoprolol tartrate 25 milliGRAM(s) Oral every 8 hours  pantoprazole    Tablet 40 milliGRAM(s) Oral before breakfast    MEDICATIONS  (PRN):  acetaminophen     Tablet .. 650 milliGRAM(s) Oral every 6 hours PRN Temp greater or equal to 38C (100.4F), Mild Pain (1 - 3)  aluminum hydroxide/magnesium hydroxide/simethicone Suspension 30 milliLiter(s) Oral every 4 hours PRN Dyspepsia  melatonin 3 milliGRAM(s) Oral at bedtime PRN Insomnia  ondansetron Injectable 4 milliGRAM(s) IV Push every 8 hours PRN Nausea and/or Vomiting      Allergies    sulfa drugs (Unknown)  statins (Unknown)  Originally Entered as [Unknown] reaction to [SULFUR] (Unknown)  Macrodantin (Unknown)    Intolerances        REVIEW OF SYSTEMS:  CONSTITUTIONAL: No fever or chills  HEENT:  No headache, no sore throat  RESPIRATORY: No cough, wheezing, or shortness of breath  CARDIOVASCULAR: No chest pain, palpitations  GASTROINTESTINAL: No abd pain, nausea, vomiting, or diarrhea  GENITOURINARY: No dysuria, frequency, or hematuria  NEUROLOGICAL: no focal weakness or dizziness  MUSCULOSKELETAL: no myalgias       Vital Signs Last 24 Hrs  T(C): 36.6 (20 Mar 2025 05:00), Max: 36.9 (19 Mar 2025 11:44)  T(F): 97.9 (20 Mar 2025 05:00), Max: 98.5 (19 Mar 2025 11:44)  HR: 77 (20 Mar 2025 05:00) (77 - 98)  BP: 134/73 (20 Mar 2025 05:00) (130/75 - 171/80)  BP(mean): --  RR: 16 (20 Mar 2025 05:00) (16 - 24)  SpO2: 96% (20 Mar 2025 05:00) (96% - 98%)    Parameters below as of 20 Mar 2025 05:00  Patient On (Oxygen Delivery Method): room air      I&O's Summary    BMI (kg/m2): 20.6 (03-19-25 @ 11:44)    PHYSICAL EXAM:  GENERAL: NAD  HEENT:  AT/NC, moist mucous membranes, EOMI, conjunctiva and sclera clear  RESPIR:  CTA b/l, no rales, wheezes, or rhonchi,  normal respiratory effort, no intercostal retractions  HEART:  RRR, S1, S2, no murmurs; no pitting edema  ABDOMEN:  BS+, soft, nontender, nondistended; No HSM  MSK/EXTREMITIES: 2+ peripheral pulses, no clubbing or cyanosis  NERVOUS SYSTEM: answers questions and follows commands appropriately, A&Ox3 grossly moves all extremities   SKIN: warm, well perfused, approprate for age, no visible lesions   PSYCH: Appropriate affect, Alert & Awake; Good judgement      LABS: Personally reviewed  CBC                        10.5   8.44  )-----------( 211      ( 20 Mar 2025 06:04 )             31.5     CMP  03-20    141  |  111  |  19  ----------------------------<  102  4.1   |  22  |  0.68    Ca    7.6      20 Mar 2025 06:04  Phos  1.9     03-20  Mg     2.2     03-20    TPro  5.7  /  Alb  2.7  /  TBili  0.7  /  DBili  x   /  AST  35  /  ALT  17  /  AlkPhos  73  03-20      Lipase: 23 U/L (03-18-25 @ 08:35)      PT/INR - ( 18 Mar 2025 08:35 )   PT: 12.8 sec;   INR: 1.09 ratio         PTT - ( 18 Mar 2025 08:35 )  PTT:30.5 sec                            Urinalysis Basic - ( 20 Mar 2025 06:04 )    Color: x / Appearance: x / SG: x / pH: x  Gluc: 102 mg/dL / Ketone: x  / Bili: x / Urobili: x   Blood: x / Protein: x / Nitrite: x   Leuk Esterase: x / RBC: x / WBC x   Sq Epi: x / Non Sq Epi: x / Bacteria: x                RADIOLOGY & ADDITIONAL TESTS: Personally reviewed.       Consultant(s) Notes Reviewed:  [x] YES  [ ] NO   Discussed with SW/CM, RN    
Patient is a 89y old  Female who presents with a chief complaint of BRBPR (21 Mar 2025 08:56)      INTERVAL HPI/OVERNIGHT EVENTS: Patient seen and examined at bedside. No overnight events.    MEDICATIONS  (STANDING):  aspirin  chewable 81 milliGRAM(s) Oral daily  chlorhexidine 2% Cloths 1 Application(s) Topical <User Schedule>  metoprolol tartrate 25 milliGRAM(s) Oral every 8 hours  pantoprazole    Tablet 40 milliGRAM(s) Oral before breakfast  senna 2 Tablet(s) Oral once    MEDICATIONS  (PRN):  acetaminophen     Tablet .. 650 milliGRAM(s) Oral every 6 hours PRN Temp greater or equal to 38C (100.4F), Mild Pain (1 - 3)  aluminum hydroxide/magnesium hydroxide/simethicone Suspension 30 milliLiter(s) Oral every 4 hours PRN Dyspepsia  melatonin 3 milliGRAM(s) Oral at bedtime PRN Insomnia  ondansetron Injectable 4 milliGRAM(s) IV Push every 8 hours PRN Nausea and/or Vomiting      Allergies    sulfa drugs (Unknown)  statins (Unknown)  Originally Entered as [Unknown] reaction to [SULFUR] (Unknown)  Macrodantin (Unknown)    Intolerances        REVIEW OF SYSTEMS:  CONSTITUTIONAL: No fever or chills  HEENT:  No headache, no sore throat  RESPIRATORY: No cough, wheezing, or shortness of breath  CARDIOVASCULAR: No chest pain, palpitations  GASTROINTESTINAL: No abd pain, nausea, vomiting, or diarrhea  GENITOURINARY: No dysuria, frequency, or hematuria  NEUROLOGICAL: no focal weakness or dizziness  MUSCULOSKELETAL: no myalgias     Vital Signs Last 24 Hrs  T(C): 36.7 (21 Mar 2025 05:31), Max: 36.8 (20 Mar 2025 11:18)  T(F): 98.1 (21 Mar 2025 05:31), Max: 98.2 (20 Mar 2025 11:18)  HR: 66 (21 Mar 2025 05:31) (66 - 83)  BP: 137/73 (21 Mar 2025 05:31) (117/83 - 137/73)  BP(mean): 94 (20 Mar 2025 14:40) (86 - 94)  RR: 18 (21 Mar 2025 05:31) (18 - 18)  SpO2: 97% (21 Mar 2025 05:31) (95% - 97%)    Parameters below as of 21 Mar 2025 05:31  Patient On (Oxygen Delivery Method): room air      I&O's Summary    BMI (kg/m2): 20.6 (03-19-25 @ 11:44)    PHYSICAL EXAM:  GENERAL: NAD  HEENT:  AT/NC, moist mucous membranes, EOMI, conjunctiva and sclera clear  RESPIR:  CTA b/l, no rales, wheezes, or rhonchi,  normal respiratory effort, no intercostal retractions  HEART:  RRR, S1, S2, no murmurs; no pitting edema  ABDOMEN:  BS+, soft, nontender, nondistended; No HSM  MSK/EXTREMITIES: 2+ peripheral pulses, no clubbing or cyanosis  NERVOUS SYSTEM: answers questions and follows commands appropriately, A&Ox3 grossly moves all extremities   SKIN: warm, well perfused, approprate for age, no visible lesions   PSYCH: Appropriate affect, Alert & Awake; Good judgement  LABS: Personally reviewed  CBC                        11.0   6.31  )-----------( 274      ( 21 Mar 2025 07:00 )             31.8     CMP  03-21    139  |  109  |  17  ----------------------------<  106  3.6   |  23  |  0.73    Ca    7.9      21 Mar 2025 07:00  Phos  1.9     03-20  Mg     2.2     03-20    TPro  5.6  /  Alb  2.7  /  TBili  0.6  /  DBili  x   /  AST  27  /  ALT  18  /  AlkPhos  74  03-21      Lipase: 23 U/L (03-18-25 @ 08:35)                                  Urinalysis Basic - ( 21 Mar 2025 07:00 )    Color: x / Appearance: x / SG: x / pH: x  Gluc: 106 mg/dL / Ketone: x  / Bili: x / Urobili: x   Blood: x / Protein: x / Nitrite: x   Leuk Esterase: x / RBC: x / WBC x   Sq Epi: x / Non Sq Epi: x / Bacteria: x                RADIOLOGY & ADDITIONAL TESTS: Personally reviewed.       Consultant(s) Notes Reviewed:  [x] YES  [ ] NO   Discussed with SW/AMELIA, RN    
The patient has been passing non-bloody loose stool overnight as a result of the bowel prep: colonoscopy scheduled for today by GI. The H/H/(12.9/39.2) and VS are stable. The abdominal examination remains normal.    PFSH: All noncontributory    MEDICATIONS REVIEWED:acetaminophen     Tablet .. 650 milliGRAM(s) Oral every 6 hours PRN  aluminum hydroxide/magnesium hydroxide/simethicone Suspension 30 milliLiter(s) Oral every 4 hours PRN  chlorhexidine 2% Cloths 1 Application(s) Topical <User Schedule>  dextrose 5% + lactated ringers. 1000 milliLiter(s) IV Continuous <Continuous>  melatonin 3 milliGRAM(s) Oral at bedtime PRN  ondansetron Injectable 4 milliGRAM(s) IV Push every 8 hours PRN  pantoprazole  Injectable 40 milliGRAM(s) IV Push daily      ALLERGIES:sulfa drugs (Unknown)  statins (Unknown)  Originally Entered as [Unknown] reaction to [SULFUR] (Unknown)  Macrodantin (Unknown)      REVIEW OF SYSTEMS:  Comprehensive Review of Systems negative except as noted in HPI      PHYSICAL EXAMINATION:  RESPIRATORY: Clear to auscultation bilaterally, respirations non-labored.  CARDIAC: RR, normal S1S2, no murmurs.  ABDOMEN: soft, BS present, no masses, no hernias, no peritoneal signs, no KLS, nontender.  VASCULAR: Equal and normal pulses.  MUSCULOSKELETAL: Full ROM, no deformities.      T(C): 36.4 (03-19-25 @ 04:00), Max: 36.8 (03-18-25 @ 20:00)  HR: 108 (03-19-25 @ 08:00) (70 - 112)  BP: 153/90 (03-19-25 @ 08:00) (85/64 - 188/86)  RR: 22 (03-19-25 @ 08:00) (13 - 24)  SpO2: 98% (03-19-25 @ 08:00) (92% - 100%)                          12.9   10.37 )-----------( 297      ( 19 Mar 2025 05:21 )             39.2       03-19    145  |  113[H]  |  13  ----------------------------<  106[H]  4.5   |  21[L]  |  0.66    Ca    8.0[L]      19 Mar 2025 05:21  Phos  2.6     03-19  Mg     2.4     03-19    TPro  6.5  /  Alb  3.2[L]  /  TBili  1.1  /  DBili  x   /  AST  24  /  ALT  18  /  AlkPhos  97  03-19      
GI Follow up  Patient seen and examined at bedside. Denies abdominal pain, denies N/V/D, no hematemesis and hematochezia, no bm since tuesday, tolerating diet, no events overnight            MEDICATIONS  (STANDING):  aspirin  chewable 81 milliGRAM(s) Oral daily  chlorhexidine 2% Cloths 1 Application(s) Topical <User Schedule>  metoprolol tartrate 25 milliGRAM(s) Oral every 8 hours  pantoprazole    Tablet 40 milliGRAM(s) Oral before breakfast    MEDICATIONS  (PRN):  acetaminophen     Tablet .. 650 milliGRAM(s) Oral every 6 hours PRN Temp greater or equal to 38C (100.4F), Mild Pain (1 - 3)  aluminum hydroxide/magnesium hydroxide/simethicone Suspension 30 milliLiter(s) Oral every 4 hours PRN Dyspepsia  melatonin 3 milliGRAM(s) Oral at bedtime PRN Insomnia  ondansetron Injectable 4 milliGRAM(s) IV Push every 8 hours PRN Nausea and/or Vomiting      Allergies    sulfa drugs (Unknown)  statins (Unknown)  Originally Entered as [Unknown] reaction to [SULFUR] (Unknown)  Macrodantin (Unknown)    Intolerances          Vital Signs Last 24 Hrs  T(C): 36.8 (20 Mar 2025 11:18), Max: 36.8 (19 Mar 2025 16:54)  T(F): 98.2 (20 Mar 2025 11:18), Max: 98.2 (19 Mar 2025 16:54)  HR: 83 (20 Mar 2025 14:40) (70 - 90)  BP: 117/83 (20 Mar 2025 14:40) (117/83 - 171/80)  BP(mean): 94 (20 Mar 2025 14:40) (86 - 94)  RR: 18 (20 Mar 2025 11:18) (16 - 18)  SpO2: 97% (20 Mar 2025 11:18) (96% - 98%)    Parameters below as of 20 Mar 2025 11:18  Patient On (Oxygen Delivery Method): room air        PHYSICAL EXAM:    Constitutional: Well-developed  ENTT: clear conjunctivae and sclera  Respiratory: clear to auscultation  Cardiovascular: S1 and S2  Gastrointestinal: +Bowel Sounds all quadrants, soft, Non tender, non distended  Extremities: No peripheral edema, neg clubbing, cyanosis  Neurological: A/O x 3  Skin: warm and dry      LABS:                        10.5   8.44  )-----------( 211      ( 20 Mar 2025 06:04 )             31.5     03-20    141  |  111[H]  |  19  ----------------------------<  102[H]  4.1   |  22  |  0.68    Ca    7.6[L]      20 Mar 2025 06:04  Phos  1.9     03-20  Mg     2.2     03-20    TPro  5.7[L]  /  Alb  2.7[L]  /  TBili  0.7  /  DBili  x   /  AST  35  /  ALT  17  /  AlkPhos  73  03-20      Urinalysis Basic - ( 20 Mar 2025 06:04 )    Color: x / Appearance: x / SG: x / pH: x  Gluc: 102 mg/dL / Ketone: x  / Bili: x / Urobili: x   Blood: x / Protein: x / Nitrite: x   Leuk Esterase: x / RBC: x / WBC x   Sq Epi: x / Non Sq Epi: x / Bacteria: x      LIVER FUNCTIONS - ( 20 Mar 2025 06:04 )  Alb: 2.7 g/dL / Pro: 5.7 g/dL / ALK PHOS: 73 U/L / ALT: 17 U/L / AST: 35 U/L / GGT: x             RADIOLOGY & ADDITIONAL TESTS:

## 2025-03-21 NOTE — DISCHARGE NOTE PROVIDER - CARE PROVIDER_API CALL
Pepe Mendez  Gastroenterology  [address], [address]  [place] [address]  Phone: [phone]  Fax: [phone]  Follow Up Time:     Kurzweil, Peter J.  Internal Medicine  [address] [place] [address]  Phone: [phone]  Fax: [phone]  Follow Up Time:

## 2025-03-21 NOTE — DISCHARGE NOTE PROVIDER - NSDCFUSCHEDAPPT_GEN_ALL_CORE_FT
Jaspreet Hood  [facility]  DERM [address]  Scheduled Appointment: 03/25/2025    [facility]  CARDIOLOGY [address]  Scheduled Appointment: 04/29/2025    Tarun Desai  [facility]  CARDIOLOGY [address]  Scheduled Appointment: 04/29/2025    [facility]  BRSTIMAG [address]  Scheduled Appointment: 05/23/2025    [facility]  ULTRASND [address]  Scheduled Appointment: 05/23/2025

## 2025-03-21 NOTE — DISCHARGE NOTE PROVIDER - NSDCCAREPROVSEEN_GEN_ALL_CORE_FT
Fredo, Minh Kaur, Olga Birmingham, Raghav Rogers, Jaspreet Alberto, Uzma Rolon, John Mednez, Pepe Watkins, Keanu Parisi, Becky Nielson, Nate

## 2025-03-21 NOTE — DISCHARGE NOTE PROVIDER - DETAILS OF MALNUTRITION DIAGNOSIS/DIAGNOSES
This patient has been assessed with a concern for Malnutrition and was treated during this hospitalization for the following Nutrition diagnosis/diagnoses:     -  03/19/2025: Severe protein-calorie malnutrition

## 2025-03-21 NOTE — DISCHARGE NOTE NURSING/CASE MANAGEMENT/SOCIAL WORK - NSDCVIVACCINE_GEN_ALL_CORE_FT
Tdap; 25-Jun-2024 18:06; Merry Cates (KRISHNA); [employer]; [id number] (Exp. Date: 01-Dec-2025); IntraMuscular; Deltoid Right.; 0.5 milliLiter(s); VIS (VIS Published: 09-May-2013, VIS Presented: 25-Jun-2024);

## 2025-03-21 NOTE — DISCHARGE NOTE NURSING/CASE MANAGEMENT/SOCIAL WORK - FINANCIAL ASSISTANCE
[facility] provides services at a reduced cost to those who are determined to be eligible through [facility]’s financial assistance program. Information regarding [facility]’s financial assistance program can be found by going to [email or web address] or by calling [phone].

## 2025-03-21 NOTE — DISCHARGE NOTE PROVIDER - ATTENDING DISCHARGE PHYSICAL EXAMINATION:
GENERAL: NAD  HEENT:  AT/NC, moist mucous membranes, EOMI, conjunctiva and sclera clear  RESPIR:  CTA b/l, no rales, wheezes, or rhonchi,  normal respiratory effort, no intercostal retractions  HEART:  RRR, S1, S2, no murmurs; no pitting edema  ABDOMEN:  BS+, soft, nontender, nondistended; No HSM  MSK/EXTREMITIES: 2+ peripheral pulses, no clubbing or cyanosis  NERVOUS SYSTEM: answers questions and follows commands appropriately, A&Ox3 grossly moves all extremities   SKIN: warm, well perfused, approprate for age, no visible lesions   PSYCH: Appropriate affect, Alert & Awake; Good judgement  LABS: Personally reviewed

## 2025-03-21 NOTE — PROGRESS NOTE ADULT - ASSESSMENT
IMPRESSION: Sigmoid diverticular hemorrhage    PLAN: Continue low fiber diet           No surgical problems evident

## 2025-03-21 NOTE — DISCHARGE NOTE PROVIDER - ATTENDING ATTESTATION STATEMENT
I have personally seen and examined the patient. I have collaborated with and supervised the
[email or web address]

## 2025-03-21 NOTE — DISCHARGE NOTE PROVIDER - HOSPITAL COURSE
HPI:  89 y/o F with PMH of CAD, arrythmia s/p ablation 2003, Hyperlipidemia, aortic aneurysm, aortic regurgitation, Hypertension, came in with c/o passing BRBPR that started yesterday night. No abdominal pain/ nausea/ vomiting. No associated chest pain/ palpitation. No fevers or chills. patient had a similar episode one month ago which resolved on its own, she did not seek medical care for it.   GI has been consulted by ER, waiting for recommendations.  (18 Mar 2025 10:49)      Hospital Course Summary: Provide a brief overview of the patient’s hospital stay, highlighting major treatments, complications, and any significant clinical events.    ---  VITALS:   Vital Signs Last 24 Hrs  T(F): 98.1 (21 Mar 2025 05:31), Max: 98.2 (20 Mar 2025 11:18)  HR: 66 (21 Mar 2025 05:31) (66 - 83)  BP: 137/73 (21 Mar 2025 05:31) (117/83 - 137/73)  RR: 18 (21 Mar 2025 05:31) (18 - 18)  SpO2: 97% (21 Mar 2025 05:31) (95% - 97%)  ---  PHYSICAL EXAM:   General: Awake and alert, cooperative with exam. No acute distress.   Cardiology: Normal S1, S2. No murmurs. Regular rate and rhythm.   Respiratory: Lungs clear to ascultation bilaterally. No wheezes, rales, or rhonchi.   Gastrointestinal: Positive bowel sounds. Soft. Non-tender. Non-distended. No guarding, rigidity, or rebound tenderness.  Extremities: No peripheral edema bilaterally.  Neurological: A+Ox3. CN 2-12 intact. No focal neurological deficits. Normal speech. No facial droop.  ---    Indicate ongoing risks or concerns:    30 Day Supply through Meds to Beds: Completed or not. If not, please provide reason why.     GOC:   • Code Status   • Summary of Goals of Care Conversation/ what matters most    Source of Infection:  Antibiotic / Last Day:    Discharging Provider:  Yanni Lees MD   Contact Info: [phone]    Outpatient Provider: Sign out given?  SNF Provider: Sign-out given?    PLEASE COPY AND PASTE INTO CARE PLAN USING CTRL V  You came to the hospital due to:   You were diagnosed with:   You were treated with:   You were prescribed the following new medications:    You will need to follow up with your primary care physician for further management.    Discharging Provider:  Yanni Lees MD  Contact Info: [phone] - Please call with any questions or concerns.          HPI:  88 y/o F with PMH of CAD, arrythmia s/p ablation 2003, Hyperlipidemia, aortic aneurysm, aortic regurgitation, Hypertension, came in with c/o passing BRBPR that started yesterday night. No abdominal pain/ nausea/ vomiting. No associated chest pain/ palpitation. No fevers or chills. patient had a similar episode one month ago which resolved on its own, she did not seek medical care for it.   GI has been consulted by ER, waiting for recommendations.  (18 Mar 2025 10:49)    < from: CT Angio Abdomen and Pelvis w/ IV Cont (03.18.25 @ 09:56) >  IMPRESSION: Active sigmoid diverticular bleed. Results discussed with   Randa Nye at time of interpretation.    Colonoscopy results:   Impressions: Severe diverticulosis of the whole colon. Internal hemorrhoids.        Hospital Course Summary: Pt was admitted for Lower GI bleed, sigmoid diverticular bleed that spontaneously resolved. On admission pt's Hgb was 9.9, pt was actively bleeding so pt was transferred to the ICU, where she received 2 units pRBC, and 1 unit of platelet. A bedside sigmoidoscopy was performed on 03/18, however not able to visualize anything d/t bleeding. Pt underwent an colonoscopy on 03/19 showing some internal hemorrhoids with no active bleeding. Surgery was consulted and no surgery was indicated. GI was consulted and pt was recommended to advance diet as tolerated. Pt was downgraded to the medical floor on 03/19 as pt was hemodynamically stable. Pt is hemodynamically stable, Hgb is 11.0 and pt has been tolerating diet of low-fiber. On discharge sign-out was given to Dr. Kurzweil, and recommended for the patient to take asa 81mg only 3 times a week instead of daily. Pt is prescribed pantoprazole 40mg qd . Pt is medically optimized and stable for discharge. Pt will follow up with gastroenterology and PCP outpatient.         ---  VITALS:   Vital Signs Last 24 Hrs  T(F): 98.1 (21 Mar 2025 05:31), Max: 98.2 (20 Mar 2025 11:18)  HR: 66 (21 Mar 2025 05:31) (66 - 83)  BP: 137/73 (21 Mar 2025 05:31) (117/83 - 137/73)  RR: 18 (21 Mar 2025 05:31) (18 - 18)  SpO2: 97% (21 Mar 2025 05:31) (95% - 97%)  ---  PHYSICAL EXAM:  GENERAL: NAD  HEENT:  AT/NC, moist mucous membranes, EOMI, conjunctiva and sclera clear  RESPIR:  CTA b/l, no rales, wheezes, or rhonchi,  normal respiratory effort, no intercostal retractions  HEART:  RRR, S1, S2, no murmurs; no pitting edema  ABDOMEN:  BS+, soft, nontender, nondistended; No HSM  MSK/EXTREMITIES: 2+ peripheral pulses, no clubbing or cyanosis  NERVOUS SYSTEM: answers questions and follows commands appropriately, A&Ox3 grossly moves all extremities   SKIN: warm, well perfused, approprate for age, no visible lesions   PSYCH: Appropriate affect, Alert & Awake; Good judgement  LABS: Personally reviewed  ---    GOC:   • Code Status : DNR/DNI, with trial of NIV  • Summary of Goals of Care Conversation/ what matters most    Discharging Provider:  Dr. John Rolon   Contact Info: [phone]    Outpatient Provider: Dr. Kurzweil  Sign out given? yes    Contact Info: [phone] - Please call with any questions or concerns.

## 2025-03-21 NOTE — CHART NOTE - NSCHARTNOTEFT_GEN_A_CORE
NUTRITION Follow Up Note    SOURCE: Patient [X]  Medical Record [X]  Nursing Staff [X]  Family Member []    DIET:   Diet, Low Fiber:   DASH/TLC {Sodium & Cholesterol Restricted} (25 @ 17:27) [Active]    Patient note with good appetite, consuming >75% of meals at this time per nursing flowsheets. Denies any chewing/swallowing difficulties. Recommend Ensure Plus High Protein 8oz PO BID (Provides 700kcal & 40grams of Protein) to enhance PO intakes and optimize nutritional status. Food preferences obtained and noted on patients file with nutrition office.     EDEMA: no edema noted    LAST BM: 19-Mar-2025    SKIN: no pressure injuries noted    WEIGHT TRENDS:  Weight in k.3 (20 Mar 2025 05:00)  Weight in k.6 (19 Mar 2025 06:13)      PERTINENT MEDICATIONS: MEDICATIONS  (STANDING):  aspirin  chewable 81 milliGRAM(s) Oral daily  chlorhexidine 2% Cloths 1 Application(s) Topical <User Schedule>  metoprolol tartrate 25 milliGRAM(s) Oral every 8 hours  pantoprazole    Tablet 40 milliGRAM(s) Oral before breakfast    MEDICATIONS  (PRN):  acetaminophen     Tablet .. 650 milliGRAM(s) Oral every 6 hours PRN Temp greater or equal to 38C (100.4F), Mild Pain (1 - 3)  aluminum hydroxide/magnesium hydroxide/simethicone Suspension 30 milliLiter(s) Oral every 4 hours PRN Dyspepsia  melatonin 3 milliGRAM(s) Oral at bedtime PRN Insomnia  ondansetron Injectable 4 milliGRAM(s) IV Push every 8 hours PRN Nausea and/or Vomiting      PERTINENT LABS:   Na139 mmol/L Glu 106 mg/dL[H] K+ 3.6 mmol/L Cr  0.73 mg/dL BUN 17 mg/dL - Phos 1.9 mg/dL[L] - Alb 2.7 g/dL[L]        ESTIMATED NEEDS:   [X] No Change Since Previous Assessment    PREVIOUS NUTRITION DIAGNOSIS:  1. Severe Protein Calorie Malnutrition    NUTRITION DIAGNOSIS IS [X] Ongoing     NEW NUTRITION DIAGNOSIS: [X] Not Applicable    INTERVENTIONS:   1. Ensure Plus High Protein 8oz PO BID (Provides 700kcal & 40grams of Protein)     MONITORING & EVALUATION:   1. Weights   2. PO intakes   3. Skin integrity   4. Tolerance to diet prescription   5. Labs & POCT  6. Follow up (per protocol)    Registered Dietitian/Nutritionist Remains Available.  Jaspreet Echevarria RD, CDN    Contact: [phone] or via MS TEAMS
89 y/o F with PMH of CAD, arrythmia s/p ablation 2003, Hyperlipidemia, aortic aneurysm, aortic regurgitation, Hypertension, came in with c/o passing BRBPR that started 3/17. No abdominal pain/ nausea/ vomiting. No associated chest pain/ palpitation. No fevers or chills. patient had a similar episode one month ago which resolved on its own, she did not seek medical care for it. Was admitted to medical floor but had RRT for vasovagal episodes and hgb 9/5. Given 2 unit PRBC and 1 PLT. Upgraded to ICU. Did not need pressors. Had bedside sigmoidoscopy on 3/18 but was unable to visualize bleeding. remained HD stable overnight and bowel prepped for colonoscopy today  3/19 which showed some internal hemorrhoids but no active bleeding. Started on clears by GI and downgraded to 3 East. Sign out given to Senior Resident on 3 East at 1610

## 2025-03-21 NOTE — DISCHARGE NOTE PROVIDER - CARE PROVIDERS DIRECT ADDRESSES
,DirectAddress_Unknown,[email or web address]

## 2025-03-21 NOTE — DISCHARGE NOTE NURSING/CASE MANAGEMENT/SOCIAL WORK - NSDCPETBCESMAN_GEN_ALL_CORE
If you are a smoker, it is important for your health to stop smoking. Please be aware that second hand smoke is also harmful. Azithromycin Pregnancy And Lactation Text: This medication is considered safe during pregnancy and is also secreted in breast milk.

## 2025-03-21 NOTE — DISCHARGE NOTE PROVIDER - NSDCMRMEDTOKEN_GEN_ALL_CORE_FT
aspirin 81 mg oral tablet: 1 tab(s) orally once a day  METOPROLOL TARTRATE 25 MG TAB: 25 milligram(s) orally 3 times a day   aspirin 81 mg oral tablet: 1 tab(s) orally 3 times a week monday wednesday friday  METOPROLOL TARTRATE 25 MG TAB: 25 milligram(s) orally 3 times a day  pantoprazole 40 mg oral delayed release tablet: 1 tab(s) orally once a day (before a meal)

## 2025-03-21 NOTE — PROGRESS NOTE ADULT - PROVIDER SPECIALTY LIST ADULT
Critical Care
Family Medicine
Family Medicine
Critical Care
Family Medicine
Surgery
Gastroenterology
Gastroenterology
Surgery

## 2025-03-21 NOTE — PROGRESS NOTE ADULT - ATTENDING COMMENTS
h/h stable on today's labs  d/c planning to home if medically stable  advanced to solids and tolerating well

## 2025-03-21 NOTE — DISCHARGE NOTE PROVIDER - NSDCCPCAREPLAN_GEN_ALL_CORE_FT
PRINCIPAL DISCHARGE DIAGNOSIS  Diagnosis: Diverticulosis of intestine with bleeding  Assessment and Plan of Treatment: You came to the hospital due to:   You were diagnosed with: bright red blood in toilet  You were treated with: IV fluids, 2 units of pRBC, 1 unit of platelets   Medication change: Only take aspirin 81mg 3 times a week, monday wednesday friday   New medication: pantoprazole 40mg once a day before breakfast  In diverticulosis, pouches called diverticula form in the wall of the large intestine (colon). The pouches do not cause any pain or other symptoms. Most people who have diverticulosis do not know they have it. If diverticulosis causes problems, the pouches may: Bleed (diverticular bleeding). Become infected (diverticulitis). Diverticula form when pressure pushes the wall of the colon outward at certain weak points. A diet that is too low in fiber can cause diverticula.  Include fruits, leafy green vegetables, beans, and whole grains in your diet each day. These foods are high in fiber.  Take a fiber supplement (such as Citrucel or Metamucil) every day if needed. Read and follow all instructions on the label.  Drink plenty of fluids. If you have kidney, heart, or liver disease and have to limit fluids, talk with your doctor before you increase the amount of fluids you drink. Get at least 30 minutes of exercise on most days of the week. Walking is a good choice. You also may want to do other activities, such as running, swimming, cycling, or playing tennis or team sports. Cut out foods that cause gas, pain, or other symptoms. Call 911 if You pass maroon or very bloody stools.  You have new or worse belly pain. You have a fever. You have nausea or vomiting  You have diarrhea or constipation. You have unusual changes in your bowel movements. You have bloating.  You cannot pass stools or gas.        SECONDARY DISCHARGE DIAGNOSES  Diagnosis: Hypertension  Assessment and Plan of Treatment: continue home medications    Diagnosis: CAD (coronary artery disease)  Assessment and Plan of Treatment: take aspirin 81 mg only 3 times a week. Monday, wednesday and Friday

## 2025-03-21 NOTE — DISCHARGE NOTE NURSING/CASE MANAGEMENT/SOCIAL WORK - PATIENT PORTAL LINK FT
You can access the FollowMyHealth Patient Portal offered by [facility] by registering at the following website: [email or web address]. By joining [facility]’s FollowMyHealth portal, you will also be able to view your health information using other applications (apps) compatible with our system.

## 2025-03-21 NOTE — PROGRESS NOTE ADULT - ASSESSMENT
89 y/o F with PMH of CAD, arrythmia s/p ablation 2003, Hyperlipidemia, aortic aneurysm, aortic regurgitation, Hypertension, came into the [facility] on  03/18  complaining of bright red blood per rectum for 1 day. Pt is admitted for:     # Lower GI bleed -  sigmoid diverticular bleeding  - Hgb 9.9 > 11.0  - 03/18 admitted to medical floor > RRT on 03/18 for hypotensive episode, pt had an episode of bright red blood per rectum. Hgb was 9.5, pt was given 2 units pRBC, 1 unit of platelets and upgraded to ICU. In the ICU pt underwent bedside sigmoidoscopy on 03/18, unable to visualize bleeding, colonoscopy 03/19 : some internal hemorrhoids w no active bleeding. As per GI, pt was started on clear liquids and tolerated. Pt is hemodynamically stable and downgraded to medical floor on 03/19  - cw telemetry   - GI consult: maintain Hgb >8, serial CBC  - Surgery consult: no surgery indicated, transfuse as needed, if bleeding continues, may require IR consult if stable enough to transfer  - restarted asa 81 mg  - f/u CBC    # HTN  - cw metoprolol 25 mg tid    # CAD  -  asa 81 mg qd    #Diet   - Diet: low fiber, tolerated    # DVT prophylaxis  - SCDs    #GOC   - dnr/dni  with trial of NIV      Case discussed with Dr. Rolon

## 2025-03-25 ENCOUNTER — APPOINTMENT (OUTPATIENT)
Dept: DERMATOLOGY | Facility: CLINIC | Age: 89
End: 2025-03-25
Payer: MEDICARE

## 2025-03-25 DIAGNOSIS — Z85.828 PERSONAL HISTORY OF OTHER MALIGNANT NEOPLASM OF SKIN: ICD-10-CM

## 2025-03-25 DIAGNOSIS — L82.1 OTHER SEBORRHEIC KERATOSIS: ICD-10-CM

## 2025-03-25 DIAGNOSIS — L92.0 GRANULOMA ANNULARE: ICD-10-CM

## 2025-03-25 DIAGNOSIS — L57.8 OTHER SKIN CHANGES DUE TO CHRONIC EXPOSURE TO NONIONIZING RADIATION: ICD-10-CM

## 2025-03-25 PROCEDURE — 17003 DESTRUCT PREMALG LES 2-14: CPT

## 2025-03-25 PROCEDURE — 99213 OFFICE O/P EST LOW 20 MIN: CPT | Mod: 25

## 2025-03-25 PROCEDURE — 17000 DESTRUCT PREMALG LESION: CPT

## 2025-04-29 ENCOUNTER — APPOINTMENT (OUTPATIENT)
Dept: CARDIOLOGY | Facility: CLINIC | Age: 89
End: 2025-04-29
Payer: MEDICARE

## 2025-04-29 ENCOUNTER — NON-APPOINTMENT (OUTPATIENT)
Age: 89
End: 2025-04-29

## 2025-04-29 VITALS
WEIGHT: 115 LBS | SYSTOLIC BLOOD PRESSURE: 176 MMHG | OXYGEN SATURATION: 97 % | DIASTOLIC BLOOD PRESSURE: 94 MMHG | HEART RATE: 73 BPM | HEIGHT: 64 IN | BODY MASS INDEX: 19.63 KG/M2 | RESPIRATION RATE: 18 BRPM

## 2025-04-29 DIAGNOSIS — I10 ESSENTIAL (PRIMARY) HYPERTENSION: ICD-10-CM

## 2025-04-29 DIAGNOSIS — I25.10 ATHEROSCLEROTIC HEART DISEASE OF NATIVE CORONARY ARTERY W/OUT ANGINA PECTORIS: ICD-10-CM

## 2025-04-29 DIAGNOSIS — I71.9 AORTIC ANEURYSM OF UNSPECIFIED SITE, W/OUT RUPTURE: ICD-10-CM

## 2025-04-29 DIAGNOSIS — E78.5 HYPERLIPIDEMIA, UNSPECIFIED: ICD-10-CM

## 2025-04-29 PROCEDURE — 93306 TTE W/DOPPLER COMPLETE: CPT

## 2025-04-29 PROCEDURE — 99215 OFFICE O/P EST HI 40 MIN: CPT | Mod: 25

## 2025-04-29 PROCEDURE — 93000 ELECTROCARDIOGRAM COMPLETE: CPT

## 2025-05-23 ENCOUNTER — APPOINTMENT (OUTPATIENT)
Dept: ULTRASOUND IMAGING | Facility: HOSPITAL | Age: 89
End: 2025-05-23

## 2025-05-23 ENCOUNTER — OUTPATIENT (OUTPATIENT)
Dept: OUTPATIENT SERVICES | Facility: HOSPITAL | Age: 89
LOS: 1 days | End: 2025-05-23
Payer: MEDICARE

## 2025-05-23 ENCOUNTER — APPOINTMENT (OUTPATIENT)
Dept: MAMMOGRAPHY | Facility: HOSPITAL | Age: 89
End: 2025-05-23

## 2025-05-23 DIAGNOSIS — Z90.710 ACQUIRED ABSENCE OF BOTH CERVIX AND UTERUS: Chronic | ICD-10-CM

## 2025-05-23 DIAGNOSIS — Z85.3 PERSONAL HISTORY OF MALIGNANT NEOPLASM OF BREAST: ICD-10-CM

## 2025-05-23 DIAGNOSIS — Z98.49 CATARACT EXTRACTION STATUS, UNSPECIFIED EYE: Chronic | ICD-10-CM

## 2025-05-23 PROCEDURE — 77067 SCR MAMMO BI INCL CAD: CPT | Mod: 26

## 2025-05-23 PROCEDURE — 76641 ULTRASOUND BREAST COMPLETE: CPT | Mod: 26,50

## 2025-05-23 PROCEDURE — 77067 SCR MAMMO BI INCL CAD: CPT

## 2025-05-23 PROCEDURE — 77063 BREAST TOMOSYNTHESIS BI: CPT

## 2025-05-23 PROCEDURE — 77063 BREAST TOMOSYNTHESIS BI: CPT | Mod: 26

## 2025-05-23 PROCEDURE — 76641 ULTRASOUND BREAST COMPLETE: CPT

## 2025-09-15 ENCOUNTER — APPOINTMENT (OUTPATIENT)
Dept: RADIOLOGY | Facility: HOSPITAL | Age: 89
End: 2025-09-15
Payer: MEDICARE

## 2025-09-15 PROCEDURE — 73110 X-RAY EXAM OF WRIST: CPT | Mod: 26,RT

## 2025-09-15 PROCEDURE — 77080 DXA BONE DENSITY AXIAL: CPT | Mod: 26

## 2025-09-15 PROCEDURE — 73130 X-RAY EXAM OF HAND: CPT | Mod: 26,RT

## (undated) DEVICE — Device

## (undated) DEVICE — ENDOCUFF VISION SZ 2 LG GRN

## (undated) DEVICE — KIT ENDO PROCEDURE CUST W/VLV

## (undated) DEVICE — TUBING CAP SET ERBEFLO CLEVERCAP HYBRID CO2 FOR OLYMPUS SCOPES AND UCR

## (undated) DEVICE — SOL IRR POUR H2O 1000ML